# Patient Record
Sex: FEMALE | Race: WHITE | Employment: OTHER | URBAN - METROPOLITAN AREA
[De-identification: names, ages, dates, MRNs, and addresses within clinical notes are randomized per-mention and may not be internally consistent; named-entity substitution may affect disease eponyms.]

---

## 2017-04-05 PROBLEM — J43.9 PULMONARY EMPHYSEMA, UNSPECIFIED EMPHYSEMA TYPE (HCC): Status: ACTIVE | Noted: 2017-04-05

## 2017-04-05 PROBLEM — E11.59 TYPE 2 DIABETES MELLITUS WITH OTHER CIRCULATORY COMPLICATION: Status: ACTIVE | Noted: 2017-04-05

## 2017-04-05 PROBLEM — I10 ESSENTIAL HYPERTENSION: Status: ACTIVE | Noted: 2017-04-05

## 2017-04-05 PROBLEM — J47.9 BRONCHIECTASIS WITHOUT COMPLICATION (HCC): Status: ACTIVE | Noted: 2017-04-05

## 2017-04-05 PROCEDURE — 82570 ASSAY OF URINE CREATININE: CPT | Performed by: INTERNAL MEDICINE

## 2017-04-05 PROCEDURE — 82043 UR ALBUMIN QUANTITATIVE: CPT | Performed by: INTERNAL MEDICINE

## 2017-04-05 PROCEDURE — 87086 URINE CULTURE/COLONY COUNT: CPT | Performed by: INTERNAL MEDICINE

## 2017-04-05 PROCEDURE — 81001 URINALYSIS AUTO W/SCOPE: CPT | Performed by: INTERNAL MEDICINE

## 2017-08-12 PROCEDURE — 87272 CRYPTOSPORIDIUM AG IF: CPT | Performed by: INTERNAL MEDICINE

## 2017-08-12 PROCEDURE — 87209 SMEAR COMPLEX STAIN: CPT | Performed by: INTERNAL MEDICINE

## 2017-08-12 PROCEDURE — 87329 GIARDIA AG IA: CPT | Performed by: INTERNAL MEDICINE

## 2017-08-12 PROCEDURE — 87045 FECES CULTURE AEROBIC BACT: CPT | Performed by: INTERNAL MEDICINE

## 2017-08-12 PROCEDURE — 87493 C DIFF AMPLIFIED PROBE: CPT | Performed by: INTERNAL MEDICINE

## 2017-08-12 PROCEDURE — 87177 OVA AND PARASITES SMEARS: CPT | Performed by: INTERNAL MEDICINE

## 2017-08-12 PROCEDURE — 87427 SHIGA-LIKE TOXIN AG IA: CPT | Performed by: INTERNAL MEDICINE

## 2017-08-12 PROCEDURE — 87046 STOOL CULTR AEROBIC BACT EA: CPT | Performed by: INTERNAL MEDICINE

## 2017-11-16 PROBLEM — E11.8 TYPE 2 DIABETES MELLITUS WITH COMPLICATION, WITHOUT LONG-TERM CURRENT USE OF INSULIN (HCC): Status: ACTIVE | Noted: 2017-04-05

## 2018-04-13 PROBLEM — E11.51 TYPE 2 DIABETES MELLITUS WITH DIABETIC PERIPHERAL ANGIOPATHY WITHOUT GANGRENE, WITHOUT LONG-TERM CURRENT USE OF INSULIN (HCC): Status: ACTIVE | Noted: 2017-04-05

## 2018-05-01 PROCEDURE — 87186 SC STD MICRODIL/AGAR DIL: CPT | Performed by: INTERNAL MEDICINE

## 2018-05-01 PROCEDURE — 87086 URINE CULTURE/COLONY COUNT: CPT | Performed by: INTERNAL MEDICINE

## 2018-05-01 PROCEDURE — 82043 UR ALBUMIN QUANTITATIVE: CPT | Performed by: INTERNAL MEDICINE

## 2018-05-01 PROCEDURE — 87077 CULTURE AEROBIC IDENTIFY: CPT | Performed by: INTERNAL MEDICINE

## 2018-05-01 PROCEDURE — 81001 URINALYSIS AUTO W/SCOPE: CPT | Performed by: INTERNAL MEDICINE

## 2018-05-01 PROCEDURE — 82570 ASSAY OF URINE CREATININE: CPT | Performed by: INTERNAL MEDICINE

## 2018-06-07 PROCEDURE — 88305 TISSUE EXAM BY PATHOLOGIST: CPT | Performed by: INTERNAL MEDICINE

## 2018-10-09 ENCOUNTER — HOSPITAL ENCOUNTER (OUTPATIENT)
Dept: INTERVENTIONAL RADIOLOGY/VASCULAR | Facility: HOSPITAL | Age: 73
Discharge: HOME | End: 2018-10-09
Attending: INTERNAL MEDICINE | Admitting: INTERNAL MEDICINE
Payer: MEDICARE

## 2018-10-09 VITALS
WEIGHT: 180 LBS | HEIGHT: 65 IN | OXYGEN SATURATION: 95 % | BODY MASS INDEX: 29.99 KG/M2 | TEMPERATURE: 99 F | SYSTOLIC BLOOD PRESSURE: 124 MMHG | DIASTOLIC BLOOD PRESSURE: 60 MMHG | RESPIRATION RATE: 19 BRPM | HEART RATE: 86 BPM

## 2018-10-09 DIAGNOSIS — R94.39 ABNORMAL STRESS TEST: ICD-10-CM

## 2018-10-09 PROCEDURE — 86850 RBC ANTIBODY SCREEN: CPT | Performed by: THORACIC SURGERY (CARDIOTHORACIC VASCULAR SURGERY)

## 2018-10-09 PROCEDURE — 85610 PROTHROMBIN TIME: CPT | Performed by: THORACIC SURGERY (CARDIOTHORACIC VASCULAR SURGERY)

## 2018-10-09 PROCEDURE — 85025 COMPLETE CBC W/AUTO DIFF WBC: CPT | Performed by: THORACIC SURGERY (CARDIOTHORACIC VASCULAR SURGERY)

## 2018-10-09 PROCEDURE — 82962 GLUCOSE BLOOD TEST: CPT

## 2018-10-09 PROCEDURE — 83036 HEMOGLOBIN GLYCOSYLATED A1C: CPT | Performed by: THORACIC SURGERY (CARDIOTHORACIC VASCULAR SURGERY)

## 2018-10-09 PROCEDURE — 87086 URINE CULTURE/COLONY COUNT: CPT | Performed by: THORACIC SURGERY (CARDIOTHORACIC VASCULAR SURGERY)

## 2018-10-09 PROCEDURE — 80048 BASIC METABOLIC PNL TOTAL CA: CPT | Performed by: INTERNAL MEDICINE

## 2018-10-09 PROCEDURE — 93458 L HRT ARTERY/VENTRICLE ANGIO: CPT

## 2018-10-09 PROCEDURE — 86900 BLOOD TYPING SEROLOGIC ABO: CPT | Performed by: THORACIC SURGERY (CARDIOTHORACIC VASCULAR SURGERY)

## 2018-10-09 PROCEDURE — 80053 COMPREHEN METABOLIC PANEL: CPT | Performed by: INTERNAL MEDICINE

## 2018-10-09 PROCEDURE — B2151ZZ FLUOROSCOPY OF LEFT HEART USING LOW OSMOLAR CONTRAST: ICD-10-PCS | Performed by: INTERNAL MEDICINE

## 2018-10-09 PROCEDURE — 99152 MOD SED SAME PHYS/QHP 5/>YRS: CPT

## 2018-10-09 PROCEDURE — B2111ZZ FLUOROSCOPY OF MULTIPLE CORONARY ARTERIES USING LOW OSMOLAR CONTRAST: ICD-10-PCS | Performed by: INTERNAL MEDICINE

## 2018-10-09 PROCEDURE — 86901 BLOOD TYPING SEROLOGIC RH(D): CPT | Performed by: THORACIC SURGERY (CARDIOTHORACIC VASCULAR SURGERY)

## 2018-10-09 PROCEDURE — 81001 URINALYSIS AUTO W/SCOPE: CPT | Performed by: THORACIC SURGERY (CARDIOTHORACIC VASCULAR SURGERY)

## 2018-10-09 PROCEDURE — 85730 THROMBOPLASTIN TIME PARTIAL: CPT | Performed by: THORACIC SURGERY (CARDIOTHORACIC VASCULAR SURGERY)

## 2018-10-09 PROCEDURE — 4A023N7 MEASUREMENT OF CARDIAC SAMPLING AND PRESSURE, LEFT HEART, PERCUTANEOUS APPROACH: ICD-10-PCS | Performed by: INTERNAL MEDICINE

## 2018-10-09 RX ORDER — SODIUM CHLORIDE 9 MG/ML
INJECTION, SOLUTION INTRAVENOUS CONTINUOUS
Status: DISCONTINUED | OUTPATIENT
Start: 2018-10-09 | End: 2018-10-09

## 2018-10-09 RX ORDER — HEPARIN SODIUM 5000 [USP'U]/ML
INJECTION, SOLUTION INTRAVENOUS; SUBCUTANEOUS
Status: COMPLETED
Start: 2018-10-09 | End: 2018-10-09

## 2018-10-09 RX ORDER — MIDAZOLAM HYDROCHLORIDE 1 MG/ML
INJECTION INTRAMUSCULAR; INTRAVENOUS
Status: COMPLETED
Start: 2018-10-09 | End: 2018-10-09

## 2018-10-09 RX ORDER — SODIUM CHLORIDE 9 MG/ML
INJECTION, SOLUTION INTRAVENOUS CONTINUOUS
Status: CANCELLED | OUTPATIENT
Start: 2018-10-09 | End: 2018-10-09

## 2018-10-09 RX ORDER — LIDOCAINE HYDROCHLORIDE 10 MG/ML
INJECTION, SOLUTION EPIDURAL; INFILTRATION; INTRACAUDAL; PERINEURAL
Status: COMPLETED
Start: 2018-10-09 | End: 2018-10-09

## 2018-10-09 RX ORDER — VERAPAMIL HYDROCHLORIDE 2.5 MG/ML
INJECTION, SOLUTION INTRAVENOUS
Status: COMPLETED
Start: 2018-10-09 | End: 2018-10-09

## 2018-10-09 RX ORDER — ASPIRIN 81 MG/1
324 TABLET, CHEWABLE ORAL ONCE
Status: DISCONTINUED | OUTPATIENT
Start: 2018-10-09 | End: 2018-10-09 | Stop reason: HOSPADM

## 2018-10-09 RX ADMIN — SODIUM CHLORIDE: 9 INJECTION, SOLUTION INTRAVENOUS at 08:10:00

## 2018-10-09 NOTE — PROGRESS NOTES
Pt s/p C with Dr. Cathleen Aguilera. Access site via R radial artery, Prelude band (15cc) removed with no complications. Dressing applied, c/d/I. VS stable; pt in stable condition, able to ambulate in room without difficulty. IV dc'd.  AVS given and explained to pt, p

## 2018-10-09 NOTE — PROCEDURES
[facility]    PATIENT'S NAME: Caty Chowdhuryato   ATTENDING PHYSICIAN: Gabino Wren. Jordan Arriaga MD   OPERATING PHYSICIAN: Gabino Arriaga MD   PATIENT ACCOUNT#:   [id number]    LOCATION:  [address]  MEDICAL RECORD #:   [id number]       DATE OF large PDA and a moderate posterolateral system. 5.   LVG:  Left ventricular size and ejection fraction are normal.    CONCLUSIONS:  This is a 68-year-old woman with CAD. Her stress test correlates appropriately with the right coronary artery.   We will co

## 2018-10-09 NOTE — PROGRESS NOTES
Cath:    LM: No disease. LAD: 50% eccentric proximal lesion, then 75% mid vessel lesion (difficult to resolve given vessel overlap but clearly diseased in AP cranial view). CX: Moderate to large. 2 OMs. Mild disease. RCA: Dominant. Large.   Ostial 70%

## 2018-10-09 NOTE — PROGRESS NOTES
Pt s/p LHC with Dr. Connolly Jobs today. Dr. Sukhwinder Horan came and saw pt at bedside. Pt's PAT's for surgery with Dr. Sukhwinder Horan completed. Pt given binder for CABG and instructions given for surgery including how to use the surgical scrub and incentive spirometer.  Pt to arrive fo

## 2018-10-09 NOTE — H&P
Cards    See EMR    Abnormal stress. Stable. Current Facility-Administered Medications:  0.9%  NaCl infusion  Intravenous Continuous   aspirin chewable tab 324 mg 324 mg Oral Once      10/09/18  0800   BP: 132/73   Pulse: 76   Resp: 18   Temp: 98. 9

## 2018-10-11 ENCOUNTER — TELEPHONE (OUTPATIENT)
Dept: CARDIOLOGY UNIT | Facility: HOSPITAL | Age: 73
End: 2018-10-11

## 2018-10-11 ENCOUNTER — LAB ENCOUNTER (OUTPATIENT)
Dept: LAB | Facility: HOSPITAL | Age: 73
End: 2018-10-11
Attending: THORACIC SURGERY (CARDIOTHORACIC VASCULAR SURGERY)
Payer: MEDICARE

## 2018-10-11 DIAGNOSIS — R35.0 URINARY FREQUENCY: ICD-10-CM

## 2018-10-11 PROCEDURE — 87088 URINE BACTERIA CULTURE: CPT

## 2018-10-11 PROCEDURE — 87186 SC STD MICRODIL/AGAR DIL: CPT

## 2018-10-11 PROCEDURE — 87086 URINE CULTURE/COLONY COUNT: CPT

## 2018-10-11 PROCEDURE — 81001 URINALYSIS AUTO W/SCOPE: CPT

## 2018-10-11 NOTE — PROGRESS NOTES
Pt called, stated she was told by Dr. Rosalie James office she has a UTI, pt c/o urinary frequency, burning and chills on urination, since yesterday. Reviewed recent UA and complaints w/Dr. Janis Cohn. UA done 10/9 shows no UTI, only normal mark, symptoms began 10/10.

## 2018-10-15 ENCOUNTER — TELEPHONE (OUTPATIENT)
Dept: CARDIOLOGY UNIT | Facility: HOSPITAL | Age: 73
End: 2018-10-15

## 2018-10-15 NOTE — PROGRESS NOTES
UA and Cx reviewed with jaquan Conte/w patient, will prescirbe abx. Pt understands and agrees.   Maki Blanchard RN  Clinical Coordinator  CV Surgery

## 2018-10-15 NOTE — PROGRESS NOTES
[facility]  [phone] Home. 1st attempt    Urology referral ordered. Labs in 6 mo ordered.

## 2018-10-17 ENCOUNTER — ANESTHESIA EVENT (OUTPATIENT)
Dept: CARDIAC SURGERY | Facility: HOSPITAL | Age: 73
DRG: 236 | End: 2018-10-17
Payer: MEDICARE

## 2018-10-17 NOTE — ANESTHESIA PREPROCEDURE EVALUATION
PRE-OP EVALUATION    Patient Name: Dana Sharp    Pre-op Diagnosis: coronary artery disease    Procedure(s):  coronary artery bypass graft   (Dr Ashlie Hsieh to start case)    Surgeon(s) and Role:     * Madhavi Knox MD - Primary    Pre-op vitals reviewed. differently: TAKE ONE TABLET BY MOUTH ONE TIME NIGHTLY) Disp: 90 tablet Rfl: 2   LISINOPRIL 40 MG Oral Tab TAKE ONE TABLET BY MOUTH EVERY DAY Disp: 90 tablet Rfl: 1   MULTI-VITAMIN/MINERALS OR TABS 1 TABLET DAILY Disp:  Rfl:        Allergies: Amlodipine; F BUNIONECTOMY  AND HAMMER TOE REMOVAL   • TOTAL KNEE REPLACEMENT Left      Social History    Tobacco Use      Smoking status: Former Smoker        Packs/day: 1.50        Years: 45.00        Pack years: 67.5        Types: Cigarettes        Quit date: 5/12/20 additional lines including arterial catheter, additional PIVs, central venous catheter/PAC and intraop PANDA. Patient understands extubation will occur once the overall hemodynamic status is stable.  Discussed rare risk of dental trauma from intubation, scra

## 2018-10-18 ENCOUNTER — HOSPITAL ENCOUNTER (INPATIENT)
Facility: HOSPITAL | Age: 73
LOS: 5 days | Discharge: HOME HEALTH | DRG: 236 | End: 2018-10-23
Attending: THORACIC SURGERY (CARDIOTHORACIC VASCULAR SURGERY) | Admitting: THORACIC SURGERY (CARDIOTHORACIC VASCULAR SURGERY)
Payer: MEDICARE

## 2018-10-18 ENCOUNTER — ANESTHESIA (OUTPATIENT)
Dept: CARDIAC SURGERY | Facility: HOSPITAL | Age: 73
DRG: 236 | End: 2018-10-18
Payer: MEDICARE

## 2018-10-18 ENCOUNTER — APPOINTMENT (OUTPATIENT)
Dept: GENERAL RADIOLOGY | Facility: HOSPITAL | Age: 73
DRG: 236 | End: 2018-10-18
Attending: THORACIC SURGERY (CARDIOTHORACIC VASCULAR SURGERY)
Payer: MEDICARE

## 2018-10-18 PROBLEM — I25.10 CAD (CORONARY ARTERY DISEASE): Status: ACTIVE | Noted: 2018-10-18

## 2018-10-18 PROCEDURE — 82803 BLOOD GASES ANY COMBINATION: CPT

## 2018-10-18 PROCEDURE — P9045 ALBUMIN (HUMAN), 5%, 250 ML: HCPCS | Performed by: THORACIC SURGERY (CARDIOTHORACIC VASCULAR SURGERY)

## 2018-10-18 PROCEDURE — 80061 LIPID PANEL: CPT | Performed by: THORACIC SURGERY (CARDIOTHORACIC VASCULAR SURGERY)

## 2018-10-18 PROCEDURE — 5A1221Z PERFORMANCE OF CARDIAC OUTPUT, CONTINUOUS: ICD-10-PCS | Performed by: THORACIC SURGERY (CARDIOTHORACIC VASCULAR SURGERY)

## 2018-10-18 PROCEDURE — 84295 ASSAY OF SERUM SODIUM: CPT | Performed by: ANESTHESIOLOGY

## 2018-10-18 PROCEDURE — 93010 ELECTROCARDIOGRAM REPORT: CPT | Performed by: INTERNAL MEDICINE

## 2018-10-18 PROCEDURE — 82803 BLOOD GASES ANY COMBINATION: CPT | Performed by: ANESTHESIOLOGY

## 2018-10-18 PROCEDURE — 85027 COMPLETE CBC AUTOMATED: CPT | Performed by: THORACIC SURGERY (CARDIOTHORACIC VASCULAR SURGERY)

## 2018-10-18 PROCEDURE — 82330 ASSAY OF CALCIUM: CPT | Performed by: ANESTHESIOLOGY

## 2018-10-18 PROCEDURE — 80048 BASIC METABOLIC PNL TOTAL CA: CPT | Performed by: THORACIC SURGERY (CARDIOTHORACIC VASCULAR SURGERY)

## 2018-10-18 PROCEDURE — 83050 HGB METHEMOGLOBIN QUAN: CPT | Performed by: ANESTHESIOLOGY

## 2018-10-18 PROCEDURE — 85730 THROMBOPLASTIN TIME PARTIAL: CPT | Performed by: THORACIC SURGERY (CARDIOTHORACIC VASCULAR SURGERY)

## 2018-10-18 PROCEDURE — B24BZZ4 ULTRASONOGRAPHY OF HEART WITH AORTA, TRANSESOPHAGEAL: ICD-10-PCS | Performed by: THORACIC SURGERY (CARDIOTHORACIC VASCULAR SURGERY)

## 2018-10-18 PROCEDURE — 85610 PROTHROMBIN TIME: CPT | Performed by: THORACIC SURGERY (CARDIOTHORACIC VASCULAR SURGERY)

## 2018-10-18 PROCEDURE — 83735 ASSAY OF MAGNESIUM: CPT | Performed by: THORACIC SURGERY (CARDIOTHORACIC VASCULAR SURGERY)

## 2018-10-18 PROCEDURE — 82375 ASSAY CARBOXYHB QUANT: CPT | Performed by: ANESTHESIOLOGY

## 2018-10-18 PROCEDURE — 86920 COMPATIBILITY TEST SPIN: CPT

## 2018-10-18 PROCEDURE — 85384 FIBRINOGEN ACTIVITY: CPT | Performed by: THORACIC SURGERY (CARDIOTHORACIC VASCULAR SURGERY)

## 2018-10-18 PROCEDURE — 30233N0 TRANSFUSION OF AUTOLOGOUS RED BLOOD CELLS INTO PERIPHERAL VEIN, PERCUTANEOUS APPROACH: ICD-10-PCS | Performed by: THORACIC SURGERY (CARDIOTHORACIC VASCULAR SURGERY)

## 2018-10-18 PROCEDURE — 84295 ASSAY OF SERUM SODIUM: CPT

## 2018-10-18 PROCEDURE — 85049 AUTOMATED PLATELET COUNT: CPT | Performed by: THORACIC SURGERY (CARDIOTHORACIC VASCULAR SURGERY)

## 2018-10-18 PROCEDURE — 85014 HEMATOCRIT: CPT

## 2018-10-18 PROCEDURE — 02100Z9 BYPASS CORONARY ARTERY, ONE ARTERY FROM LEFT INTERNAL MAMMARY, OPEN APPROACH: ICD-10-PCS | Performed by: THORACIC SURGERY (CARDIOTHORACIC VASCULAR SURGERY)

## 2018-10-18 PROCEDURE — S0028 INJECTION, FAMOTIDINE, 20 MG: HCPCS | Performed by: THORACIC SURGERY (CARDIOTHORACIC VASCULAR SURGERY)

## 2018-10-18 PROCEDURE — 87081 CULTURE SCREEN ONLY: CPT | Performed by: THORACIC SURGERY (CARDIOTHORACIC VASCULAR SURGERY)

## 2018-10-18 PROCEDURE — 85018 HEMOGLOBIN: CPT | Performed by: ANESTHESIOLOGY

## 2018-10-18 PROCEDURE — 84132 ASSAY OF SERUM POTASSIUM: CPT | Performed by: ANESTHESIOLOGY

## 2018-10-18 PROCEDURE — 94150 VITAL CAPACITY TEST: CPT

## 2018-10-18 PROCEDURE — 82330 ASSAY OF CALCIUM: CPT

## 2018-10-18 PROCEDURE — 83605 ASSAY OF LACTIC ACID: CPT | Performed by: ANESTHESIOLOGY

## 2018-10-18 PROCEDURE — 06JY4ZZ INSPECTION OF LOWER VEIN, PERCUTANEOUS ENDOSCOPIC APPROACH: ICD-10-PCS | Performed by: THORACIC SURGERY (CARDIOTHORACIC VASCULAR SURGERY)

## 2018-10-18 PROCEDURE — 84132 ASSAY OF SERUM POTASSIUM: CPT

## 2018-10-18 PROCEDURE — 82962 GLUCOSE BLOOD TEST: CPT

## 2018-10-18 PROCEDURE — 93005 ELECTROCARDIOGRAM TRACING: CPT

## 2018-10-18 PROCEDURE — 021009W BYPASS CORONARY ARTERY, ONE ARTERY FROM AORTA WITH AUTOLOGOUS VENOUS TISSUE, OPEN APPROACH: ICD-10-PCS | Performed by: THORACIC SURGERY (CARDIOTHORACIC VASCULAR SURGERY)

## 2018-10-18 PROCEDURE — 94002 VENT MGMT INPAT INIT DAY: CPT

## 2018-10-18 PROCEDURE — 06BP4ZZ EXCISION OF RIGHT SAPHENOUS VEIN, PERCUTANEOUS ENDOSCOPIC APPROACH: ICD-10-PCS | Performed by: THORACIC SURGERY (CARDIOTHORACIC VASCULAR SURGERY)

## 2018-10-18 PROCEDURE — 85347 COAGULATION TIME ACTIVATED: CPT

## 2018-10-18 PROCEDURE — 71045 X-RAY EXAM CHEST 1 VIEW: CPT | Performed by: THORACIC SURGERY (CARDIOTHORACIC VASCULAR SURGERY)

## 2018-10-18 RX ORDER — FAMOTIDINE 20 MG/1
20 TABLET ORAL 2 TIMES DAILY
Status: DISCONTINUED | OUTPATIENT
Start: 2018-10-18 | End: 2018-10-23

## 2018-10-18 RX ORDER — CHLORHEXIDINE GLUCONATE 0.12 MG/ML
15 RINSE ORAL
Status: DISCONTINUED | OUTPATIENT
Start: 2018-10-18 | End: 2018-10-18

## 2018-10-18 RX ORDER — MORPHINE SULFATE 4 MG/ML
8 INJECTION, SOLUTION INTRAMUSCULAR; INTRAVENOUS
Status: DISCONTINUED | OUTPATIENT
Start: 2018-10-18 | End: 2018-10-23

## 2018-10-18 RX ORDER — MIDAZOLAM HYDROCHLORIDE 1 MG/ML
1 INJECTION INTRAMUSCULAR; INTRAVENOUS EVERY 30 MIN PRN
Status: DISCONTINUED | OUTPATIENT
Start: 2018-10-18 | End: 2018-10-18

## 2018-10-18 RX ORDER — TRAMADOL HYDROCHLORIDE 50 MG/1
50 TABLET ORAL EVERY 6 HOURS PRN
Status: DISCONTINUED | OUTPATIENT
Start: 2018-10-18 | End: 2018-10-23

## 2018-10-18 RX ORDER — DEXTROSE AND SODIUM CHLORIDE 5; .45 G/100ML; G/100ML
INJECTION, SOLUTION INTRAVENOUS CONTINUOUS
Status: ACTIVE | OUTPATIENT
Start: 2018-10-18 | End: 2018-10-19

## 2018-10-18 RX ORDER — POTASSIUM CHLORIDE 14.9 MG/ML
20 INJECTION INTRAVENOUS AS NEEDED
Status: DISCONTINUED | OUTPATIENT
Start: 2018-10-18 | End: 2018-10-21

## 2018-10-18 RX ORDER — ONDANSETRON 2 MG/ML
4 INJECTION INTRAMUSCULAR; INTRAVENOUS EVERY 6 HOURS PRN
Status: DISCONTINUED | OUTPATIENT
Start: 2018-10-18 | End: 2018-10-23

## 2018-10-18 RX ORDER — NITROGLYCERIN 20 MG/100ML
INJECTION INTRAVENOUS CONTINUOUS PRN
Status: DISCONTINUED | OUTPATIENT
Start: 2018-10-18 | End: 2018-10-20

## 2018-10-18 RX ORDER — POTASSIUM CHLORIDE 29.8 MG/ML
40 INJECTION INTRAVENOUS AS NEEDED
Status: DISCONTINUED | OUTPATIENT
Start: 2018-10-18 | End: 2018-10-21

## 2018-10-18 RX ORDER — DEXTROSE MONOHYDRATE 25 G/50ML
50 INJECTION, SOLUTION INTRAVENOUS
Status: DISCONTINUED | OUTPATIENT
Start: 2018-10-18 | End: 2018-10-23

## 2018-10-18 RX ORDER — ASPIRIN 325 MG
325 TABLET ORAL ONCE
Status: COMPLETED | OUTPATIENT
Start: 2018-10-18 | End: 2018-10-18

## 2018-10-18 RX ORDER — ATORVASTATIN CALCIUM 20 MG/1
20 TABLET, FILM COATED ORAL NIGHTLY
Status: DISCONTINUED | OUTPATIENT
Start: 2018-10-18 | End: 2018-10-23

## 2018-10-18 RX ORDER — MAGNESIUM SULFATE 1 G/100ML
1 INJECTION INTRAVENOUS AS NEEDED
Status: DISCONTINUED | OUTPATIENT
Start: 2018-10-18 | End: 2018-10-21

## 2018-10-18 RX ORDER — FAMOTIDINE 10 MG/ML
20 INJECTION, SOLUTION INTRAVENOUS 2 TIMES DAILY
Status: DISCONTINUED | OUTPATIENT
Start: 2018-10-18 | End: 2018-10-21

## 2018-10-18 RX ORDER — TEMAZEPAM 15 MG/1
15 CAPSULE ORAL NIGHTLY PRN
Status: DISCONTINUED | OUTPATIENT
Start: 2018-10-18 | End: 2018-10-23

## 2018-10-18 RX ORDER — BISACODYL 10 MG
10 SUPPOSITORY, RECTAL RECTAL
Status: DISCONTINUED | OUTPATIENT
Start: 2018-10-18 | End: 2018-10-23

## 2018-10-18 RX ORDER — MORPHINE SULFATE 4 MG/ML
4 INJECTION, SOLUTION INTRAMUSCULAR; INTRAVENOUS
Status: DISCONTINUED | OUTPATIENT
Start: 2018-10-18 | End: 2018-10-23

## 2018-10-18 RX ORDER — DEXMEDETOMIDINE HYDROCHLORIDE 4 UG/ML
INJECTION, SOLUTION INTRAVENOUS CONTINUOUS
Status: DISCONTINUED | OUTPATIENT
Start: 2018-10-18 | End: 2018-10-18

## 2018-10-18 RX ORDER — DOCUSATE SODIUM 100 MG/1
100 CAPSULE, LIQUID FILLED ORAL 2 TIMES DAILY
Status: DISCONTINUED | OUTPATIENT
Start: 2018-10-18 | End: 2018-10-23

## 2018-10-18 RX ORDER — MAGNESIUM SULFATE HEPTAHYDRATE 40 MG/ML
2 INJECTION, SOLUTION INTRAVENOUS AS NEEDED
Status: DISCONTINUED | OUTPATIENT
Start: 2018-10-18 | End: 2018-10-21

## 2018-10-18 RX ORDER — ASPIRIN 81 MG/1
81 TABLET ORAL DAILY
Status: DISCONTINUED | OUTPATIENT
Start: 2018-10-19 | End: 2018-10-23

## 2018-10-18 RX ORDER — ALBUMIN, HUMAN INJ 5% 5 %
250 SOLUTION INTRAVENOUS ONCE AS NEEDED
Status: COMPLETED | OUTPATIENT
Start: 2018-10-18 | End: 2018-10-19

## 2018-10-18 RX ORDER — ASPIRIN 300 MG
300 SUPPOSITORY, RECTAL RECTAL ONCE
Status: COMPLETED | OUTPATIENT
Start: 2018-10-18 | End: 2018-10-18

## 2018-10-18 RX ORDER — DOBUTAMINE HYDROCHLORIDE 200 MG/100ML
INJECTION INTRAVENOUS CONTINUOUS PRN
Status: DISCONTINUED | OUTPATIENT
Start: 2018-10-18 | End: 2018-10-20

## 2018-10-18 RX ORDER — SODIUM CHLORIDE 9 MG/ML
INJECTION, SOLUTION INTRAVENOUS CONTINUOUS
Status: DISCONTINUED | OUTPATIENT
Start: 2018-10-18 | End: 2018-10-20

## 2018-10-18 RX ORDER — MORPHINE SULFATE 4 MG/ML
2 INJECTION, SOLUTION INTRAMUSCULAR; INTRAVENOUS
Status: DISCONTINUED | OUTPATIENT
Start: 2018-10-18 | End: 2018-10-23

## 2018-10-18 RX ORDER — POLYETHYLENE GLYCOL 3350 17 G/17G
1 POWDER, FOR SOLUTION ORAL DAILY PRN
Status: DISCONTINUED | OUTPATIENT
Start: 2018-10-18 | End: 2018-10-23

## 2018-10-18 RX ORDER — DEXTROSE AND SODIUM CHLORIDE 5; .45 G/100ML; G/100ML
INJECTION, SOLUTION INTRAVENOUS CONTINUOUS
Status: ACTIVE | OUTPATIENT
Start: 2018-10-18 | End: 2018-10-18

## 2018-10-18 RX ORDER — ALBUMIN, HUMAN INJ 5% 5 %
500 SOLUTION INTRAVENOUS ONCE
Status: COMPLETED | OUTPATIENT
Start: 2018-10-18 | End: 2018-10-18

## 2018-10-18 NOTE — CONSULTS
[facility] [address] [place] [address] Cardiology  Report of Consultation    Td Garner Patient Status:  Inpatient    1945 MRN [id number]   [facility] 6NE-A Attending William Lawrence MD   Hosp Day # 0 PCP Kelly Oliva MD     Reason for [facility] with Dr. Jordon Davis had polyp removal   • Screening for cardiovascular condition 12-3-07    EF 70%, normal CGXT   • Screening for osteoporosis 8-10, 5/15    normal, repeat in 2020   • Sebaceous cyst 4/4/16    upper neck, present for years   • Shingles June 2 Glucose-Vitamin C **OR** dextrose **OR** glucose **OR** Glucose-Vitamin C, Midazolam HCl    Outpatient Medications:     No current facility-administered medications on file prior to encounter.    Current Outpatient Medications on File Prior to Encounter:  a to ascultation bilaterally. No focal rales, rhonchi, or wheezes. Good air movement is noted throughout all lung fields. Abdomen: Soft. Non-distended. Non-tender. Bowel sounds are present and normoactive. No guarding or rebound.    Extremities: Extrem ventricular size and ejection fraction are normal.     CONCLUSIONS:  This is a 69-year-old woman with CAD. Her stress test correlates appropriately with the right coronary artery. We will consider revascularization.   The patient will continue with medica

## 2018-10-18 NOTE — OPERATIVE REPORT
[facility]    PATIENT'S NAME: Silvana Sharpe   ATTENDING PHYSICIAN: Garfield Long MD   OPERATING PHYSICIAN: Garfield Lnog MD   PATIENT ACCOUNT#:   [id number]    LOCATION:  [address]  MEDICAL RECORD #:   [id number]       YOB: 1945 was a 1.5 mm artery of good quality. Cardioplegia was given, following which the LAD was identified. Left internal mammary artery was placed to the LAD, roughly at the junction of the middle and distal third.   The middle of the LAD was buried in an intra

## 2018-10-18 NOTE — ANESTHESIA POSTPROCEDURE EVALUATION
18 [facility] Patient Status:  Inpatient   Age/Gender 75year old female MRN [id number]   [facility] 6NE-A Attending Anthony Brower MD   Hosp Day # 0 PCP Cathi Medeiros MD       Anesthesia Post-op Note    Procedure(s):  co

## 2018-10-18 NOTE — PROGRESS NOTES
Anesthesia Ventilator Management    Patient is s/p CABG x 2  POD#0. Patient is currently sedated and intubated. Vent settings: PRVC   ABG, CXR pending    Will wean FiO2 as tolerated.   Plan for extubation after CPAP trial.

## 2018-10-18 NOTE — CONSULTS
[facility] Hospitalist H&P       CC: medicine consult for comanagement of medical conditions     PCP: Sophie Ivey MD    History of Present Illness: Patient is a 75year old female with PMH sig for CAD, HTN, HL, T2DM, GERD and stable pulm nodules is now POD # back   • Type II or unspecified type diabetes mellitus without mention of complication, not stated as uncontrolled         PSH  Past Surgical History:   Procedure Laterality Date   • APPENDECTOMY  1956   • APPENDECTOMY     • CATARACT Bilateral    • COLONOS METFORMIN  MG Oral Tab TAKE TWO TABLETS BY MOUTH DAILY WITH BREAKFAST Disp: 180 tablet Rfl: 0   SIMVASTATIN 40 MG Oral Tab TAKE ONE TABLET BY MOUTH ONE TIME DAILY  (Patient taking differently: TAKE ONE TABLET BY MOUTH ONE TIME NIGHTLY) Disp: 90 ta Nares normal. Septum midline. Mucosa normal. No drainage.    Throat: Lips, mucosa, and tongue normal. Teeth and gums normal.   Neck: Supple, symmetrical, trachea midline, no cervical or supraclavicular lymph adenopathy, thyroid: no enlargment/tenderness/nod visualized. Mediastinal drain/chest tube noted. Cardiac silhouette is normal in size. Mild left basilar atelectasis. No measurable pneumothorax. Endotracheal tube terminates at the inferior aspect of the thoracic inlet.       CONCLUSION:  No measurable

## 2018-10-18 NOTE — PROGRESS NOTES
Received patient from [place] [address] around 1230. Usual lines. Dobs at 4mcg per Dr. Alisha Mariscal.  titrating levo, Insulin, and precedex. Chest tube has had minimal output. Banda has had adequate output. Patient slowly waking, and following simple commands.  Able to shake hea

## 2018-10-18 NOTE — DIETARY NOTE
Nutrition Short Note    Dietitian consult received per cardiac rehab/CHF protocol. Pt to be educated by cardiac rehab staff and encouraged to attend outpatient classes taught by RD. RD available PRN.     Wayne Comer MS, RD, LDN

## 2018-10-19 ENCOUNTER — APPOINTMENT (OUTPATIENT)
Dept: GENERAL RADIOLOGY | Facility: HOSPITAL | Age: 73
DRG: 236 | End: 2018-10-19
Attending: THORACIC SURGERY (CARDIOTHORACIC VASCULAR SURGERY)
Payer: MEDICARE

## 2018-10-19 PROCEDURE — 80048 BASIC METABOLIC PNL TOTAL CA: CPT | Performed by: THORACIC SURGERY (CARDIOTHORACIC VASCULAR SURGERY)

## 2018-10-19 PROCEDURE — 71045 X-RAY EXAM CHEST 1 VIEW: CPT | Performed by: THORACIC SURGERY (CARDIOTHORACIC VASCULAR SURGERY)

## 2018-10-19 PROCEDURE — 85025 COMPLETE CBC W/AUTO DIFF WBC: CPT | Performed by: THORACIC SURGERY (CARDIOTHORACIC VASCULAR SURGERY)

## 2018-10-19 PROCEDURE — 93005 ELECTROCARDIOGRAM TRACING: CPT

## 2018-10-19 PROCEDURE — 93010 ELECTROCARDIOGRAM REPORT: CPT | Performed by: INTERNAL MEDICINE

## 2018-10-19 PROCEDURE — 97162 PT EVAL MOD COMPLEX 30 MIN: CPT

## 2018-10-19 PROCEDURE — 83735 ASSAY OF MAGNESIUM: CPT | Performed by: THORACIC SURGERY (CARDIOTHORACIC VASCULAR SURGERY)

## 2018-10-19 PROCEDURE — 97116 GAIT TRAINING THERAPY: CPT

## 2018-10-19 PROCEDURE — 82962 GLUCOSE BLOOD TEST: CPT

## 2018-10-19 PROCEDURE — 97166 OT EVAL MOD COMPLEX 45 MIN: CPT

## 2018-10-19 PROCEDURE — P9045 ALBUMIN (HUMAN), 5%, 250 ML: HCPCS | Performed by: THORACIC SURGERY (CARDIOTHORACIC VASCULAR SURGERY)

## 2018-10-19 NOTE — PLAN OF CARE
Assumed are or Miriam @approximately 1915 drowsy but easily awakened; alert, oriented, pleasant, and cooperative with care; extubated prior to start of this shift, on Venti mask weaned to 4L NC; NSR on the monitor; insulin, dobutamine and levophed gtts inf

## 2018-10-19 NOTE — PROGRESS NOTES
[facility] Cardiology  Progress Note    Keaton Marino Patient Status:  Inpatient    1945 MRN [id number]   [facility] 6NE-A Attending [place], MD   Hosp Day # 1 PCP Humberto Martin MD     Subjective:   Extubated.   Resti Summary (Last 24 hours) at 10/19/2018 1111  Last data filed at 10/19/2018 1100  Gross per 24 hour   Intake 6273 ml   Output 4360 ml   Net 1913 ml       Wt Readings from Last 3 Encounters:  10/19/18 : 196 lb 10.4 oz (89.2 kg)  10/05/18 : 180 lb (81.6 kg)  1 NEPRELIM   --   9.06*   WBC  16.0*  11.6   PLT  169.0  145.0*       Recent Labs   Lab  10/18/18   1119  10/18/18   1235   PTP  22.8*  18.3*   INR  1.94*  1.46*       No results for input(s): TROP, CK in the last 168 hours.       Tele:     Diagnostics:
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
## 2018-10-19 NOTE — H&P
2018    RE:    Gearld Leak  : 1945    I had the opportunity to see  patient, Mrs. Riki Carvajal, today for consideration for coronary bypass surgery.     As you know, Mrs. Enos Meigs is a middle-aged female patient wh Physical examination shows an alert female in no distress. Pupils are equal and round. Carotid pulses are equal bilaterally. There are no bruits. The lungs are clear without rales, rhonchi, or wheezing. The spine is straight and in the midline.   Alta Purdy

## 2018-10-19 NOTE — CONSULTS
[facility]    PATIENT'S NAME: Cristinorobert Kike   ATTENDING PHYSICIAN: Martha Carter MD   CONSULTING PHYSICIAN: Kayla Brewer M.D.    PATIENT ACCOUNT#:   [de-identified]    LOCATION:  [address] [place] [address]  MEDICAL RECORD #:   [id number]       DATE OF BIRTH: pancreatitis. Another son with hypertriglyceridemia. Paternal aunt with breast cancer. REVIEW OF SYSTEMS:  Ten-point review of systems unremarkable, otherwise as above. PHYSICAL EXAMINATION:    GENERAL:  No apparent distress.   VITAL SIGNS:  [employer]

## 2018-10-19 NOTE — PROGRESS NOTES
[facility] Hospitalist Progress Note     PCP: Marylin Corcoran MD    SUBJECTIVE:  Extubated overnight. Remains on levophed gtt. Dobutamine gtt weaned off. Pt is up in a chair, feeling well.     OBJECTIVE:  Temp:  [96.9 °F (36.1 °C)-99.2 °F (37.3 °C)] 99.2 °F (3 for input(s): TROP in the last 168 hours. Imaging:  CXR - Minimally improved atelectasis/consolidation in the retrocardiac left lower lobe. Mild increase in atelectasis at the right lung base.       Meds:     • Insulin Aspart Pen  1-30 Units Subcutaneou atelectasis  -successfully extubated; cont aggressive IS    ABLA  -expected surgical blood loss; monitor    Mild thrombocytopenia  -observe for now     Prolonged QT - being monitored in CICU per cardiology  -avoid QT prolonging agents     Mild hyponatremia

## 2018-10-19 NOTE — PHYSICAL THERAPY NOTE
PHYSICAL THERAPY EVALUATION - INPATIENT     Room Number: 5064/7468-Q  Evaluation Date: 10/19/2018  Type of Evaluation: Initial  Physician Order: PT Eval and Treat    Presenting Problem: CAD s/p CABGx2  Reason for Therapy: Mobility Dysfunction and Dis left 11/4/2015   • High blood pressure    • High cholesterol    • History of normal resting EKG 8/22/14   • HTN (hypertension)    • Hyperlipidemia    • Hyperplastic colon polyp 06/13/2018    x2, no need to repeat   • Left-sided low back pain with left-side [address]   • RIGHT PHACOEMULSIFICATION OF CATARACT WITH INTRAOCULAR LENS IMPLANT 13158 Left 7/23/2008    Performed by Phillip Linares MD at [address] [place] [address]   • SPECIAL SERVICE OR REPORT Right     BUNIONECTOMY  AND HAMMER TOE REMOVAL 4+/5; B hip flexion 3+/5; unable to test R knee strength 2/2 pain with ext (pt reports \"it's due for a total knee\")    BALANCE  Static Sitting: Fair -  Dynamic Sitting: Fair -  Static Standing: Fair -  Dynamic Standing: Fair -    ACTIVITY TOLERANCE  Puls lightheadedness upon sitting and vitals are stable. Pt demonstrates appropriate static and dynamic sitting balance with UE support during MMT, coordination, and sensation testing. Pt scoots forward to place feet on ground with CGA.  Pt requires VC for hand returning to prior to level of function. DISCHARGE RECOMMENDATIONS  PT Discharge Recommendations: Home with home health PT    PLAN  PT Treatment Plan: Bed mobility; Endurance; Patient education; Family education;Gait training;Neuromuscular re-educate;Stair t

## 2018-10-19 NOTE — PROGRESS NOTES
[facility]  CV Surgery Progress Note    Appiah Masker Patient Status:  Inpatient    1945 MRN [id number]   [facility] 6NE-A Attending William Lawrence MD   Hosp Day # 1 PCP Kelly Oliva MD     Subjective:  Patient seen this AM, e Kenn Liu  10/19/2018  7:54 AM

## 2018-10-20 PROCEDURE — 80048 BASIC METABOLIC PNL TOTAL CA: CPT | Performed by: THORACIC SURGERY (CARDIOTHORACIC VASCULAR SURGERY)

## 2018-10-20 PROCEDURE — 82962 GLUCOSE BLOOD TEST: CPT

## 2018-10-20 PROCEDURE — 87086 URINE CULTURE/COLONY COUNT: CPT | Performed by: INTERNAL MEDICINE

## 2018-10-20 PROCEDURE — 81001 URINALYSIS AUTO W/SCOPE: CPT | Performed by: INTERNAL MEDICINE

## 2018-10-20 NOTE — PROGRESS NOTES
[facility]  Progress Note    Levi Dykes Patient Status:  Inpatient    1945 MRN [id number]   [facility] 6NE-A Attending Servando Hankins MD   Hosp Day # 2 PCP Faye Ayala MD     Assessment/Plan:  Patient Active Problem List: Summary (Last 24 hours) at 10/20/2018 0821  Last data filed at 10/20/2018 0700  Gross per 24 hour   Intake 1639 ml   Output 1000 ml   Net 639 ml       HEENT: Exam is unremarkable. Neck: Supple. Lungs: Clear bilaterally. Cardiac: Regular rate and rhythm.

## 2018-10-20 NOTE — PROGRESS NOTES
[facility] Cardiology Progress Note - Lily Proctor Patient Status:  Inpatient    1945 MRN [id number]   [facility] 6NE-A Attending Gwenette Curling, MD   Hosp Day # 2 PCP Mishel Santiago MD     Subjective:  Patient f heave or extra cardiac sounds. Lungs: Clear without wheezes, rales, rhonchi or dullness. Normal excursions and effort. Abdomen: Soft, non-tender. No organosplenomegally, mass or rebound, BS-present. Extremities: Without clubbing, cyanosis or edema.   Pe mg 8 mg Intravenous Q1H PRN   temazepam (RESTORIL) cap 15 mg 15 mg Oral Nightly PRN   DOBUTamine in D5W (DOBUTREX) 500 mg/250 ml infusion 2.5-40 mcg/kg/min Intravenous Continuous PRN   nitroGLYCERIN infusion 50mg in D5W 250ml 5-300 mcg/min Intravenous Cont ROS:  General Health: otherwise feels well, weight stable  Constitutiona: no recent fevers  Skin: denies any unusual skin lesions or rashes  Eyes: no visual complaints or deficits  HEENT: denies nasal congestion, sinus pain; hearing loss negative  Re

## 2018-10-20 NOTE — PLAN OF CARE
Assumed care of Miriam @ approximately 1915: awake, alert, oriented, pleasant, and cooperative with care; family at bedside.  Complete de-lined prior to start of this shift; 4L NC, NSR on the monitor; up to bathroom with standby assist; Gurinder Vaz reports mild

## 2018-10-20 NOTE — PROGRESS NOTES
[facility]   [facility] Progress Note    Keaton Marino Patient Status:  Inpatient    1945 MRN [id number]   [facility] 8NE-A Attending [place], MD   Hosp Day # 2 PCP Humberto Martin MD     Subjective:  Pt seen and examined.  Up to 100 mg 100 mg Oral BID   magnesium hydroxide (MILK OF MAGNESIA) 400 MG/5ML suspension 10 mL 10 mL Oral BID PRN   PEG 3350 (MIRALAX) powder packet 17 g 1 packet Oral Daily PRN   bisacodyl (DULCOLAX) rectal suppository 10 mg 10 mg Rectal Daily PRN   ondanset retinopathy ([address]     Aortic atherosclerosis ([address]     Pulmonary scarring     PAD (peripheral artery disease) (HCC)     Essential hypertension     Bronchiectasis without complication ([address]     Pulmonary emphysema, unspecified emphysema type ([address]     CAD (

## 2018-10-20 NOTE — PLAN OF CARE
REC'D FROM CCU AOX3, ON ROOM AIR. O2 SAT 86-88%, REPLACED O2 AT 2LPER N/C AND O2 SAT 93%. lUNGS DIMINIAHED BILAT, RIGHT GREATER THAN LEFT. OCC COUGH WITH NO SPUTUM. PAIN UNDER CONTROL. DENIES NEED FOR PAIN MEDS. VOIDED AND UA SENT. PT UP IN CHAIR.  INCISION

## 2018-10-20 NOTE — HOME CARE LIAISON
Met with pt at the bedside. Pt is declining home health services at this time.  Thank you for the referral.

## 2018-10-21 PROCEDURE — 97110 THERAPEUTIC EXERCISES: CPT

## 2018-10-21 PROCEDURE — 82962 GLUCOSE BLOOD TEST: CPT

## 2018-10-21 PROCEDURE — 97116 GAIT TRAINING THERAPY: CPT

## 2018-10-21 NOTE — PROGRESS NOTES
[facility] Hospitalist Progress Note     PCP: Jennifer Arriola MD    SUBJECTIVE:  Doing well. Pain controlled. Denies sob. On RA.         OBJECTIVE:  Temp:  [97.6 °F (36.4 °C)-98.9 °F (37.2 °C)] 97.6 °F (36.4 °C)  Pulse:  [] 78  Resp:  [16-21] 20  BP: (117- the retrocardiac left lower lobe. Mild increase in atelectasis at the right lung base.       Meds:     • Insulin Aspart Pen  1-30 Units Subcutaneous TID CC and HS   • Insulin Aspart Pen  1-30 Units Subcutaneous TID CC and HS   • aspirin  81 mg Oral Daily

## 2018-10-21 NOTE — PROGRESS NOTES
[facility] Cardiology Progress Note - Katy Mckeon Patient Status:  Inpatient    1945 MRN [id number]   [facility] 8NE-A Attending [place], MD   Hosp Day # 3 PCP Humberto Martin MD     Subjective:  Patient f cardiac sounds. Lungs: Clear without wheezes, rales, rhonchi or dullness. Normal excursions and effort. Abdomen: Soft, non-tender. No organosplenomegally, mass or rebound, BS-present. Extremities: Without clubbing, cyanosis or edema.   Peripheral pulses 15 mg Oral Nightly PRN   metoprolol Tartrate (LOPRESSOR) tab 25 mg 25 mg Oral 2x Daily(Beta Blocker)   docusate sodium (COLACE) cap 100 mg 100 mg Oral BID   Or      docusate sodium (COLACE) liquid 100 mg 100 mg Oral BID   magnesium hydroxide (MILK OF MAGNE no sensory or motor complaint  Psyche: no symptoms of depression or anxiety  Hematology: denies bruising or excessive bleeding  Endocrine: no history of diabetes  Allergy: see above drug allergies    Dipesh Mcmanus MD  10/21/2018  8:21 AM

## 2018-10-21 NOTE — PHYSICAL THERAPY NOTE
PHYSICAL THERAPY TREATMENT NOTE - INPATIENT    Room Number: 6590/9815-B     Session: 1   Number of Visits to Meet Established Goals: 3    Presenting Problem: CAD s/p CABGx2    History related to current admission:   Pt is 75year old female admitted on 10 Jan 2011    endometrial bx neg with Dr. Patricia Barton had polyp removal   • Screening for cardiovascular condition 12-3-07    EF 70%, normal CGXT   • Screening for osteoporosis 8-10, 5/15    normal, repeat in 2020   • Sebaceous cyst 4/4/16    upper neck, presen sternum, incisional  Management Techniques: Activity promotion; Body mechanics    BALANCE                                                                                                                     Static Sitting: Fair +  Dynamic Sitting: Fair + UE, LE, and chest ther ex per CV binder.     THERAPEUTIC EXERCISES  Lower Extremity Alternating marching  Ankle pumps  Heel raises  LAQ  Partial squats, ankle circles     Upper Extremity Elbow flex/ext,  - open/close, Scapular Retraction, Shoulder flex/ precautions without VC.     Goal #6     Goal Comments: Goals established on 10/19/2018, in progress 10/21/2018

## 2018-10-21 NOTE — PLAN OF CARE
Problem: CARDIOVASCULAR - ADULT  Goal: Maintains optimal cardiac output and hemodynamic stability  INTERVENTIONS:  - Monitor vital signs, rhythm, and trends  - Monitor for bleeding, hypotension and signs of decreased cardiac output  - Evaluate effectivenes suctioning and perform as needed  - Assess and instruct to report SOB or any respiratory difficulty  - Respiratory Therapy support as indicated  - Manage/alleviate anxiety  - Monitor for signs/symptoms of CO2 retention  Outcome: Progressing      Problem: P development  - Assess and document skin integrity  - Assess and document dressing/incision, wound bed, drain sites and surrounding tissue  - Implement wound care per orders  - Initiate isolation precautions as appropriate  - Initiate Pressure Ulcer prevent cooperative. SR on Tele monitor, sats greater than 92% on 2 L Oxygen per NC, denied any pain or distress. Pt. Ambulated in halls a total of 3 times today, did report shortness of breath during and after her walks.  Mid sternal and left leg incisions intact

## 2018-10-21 NOTE — PROGRESS NOTES
[facility]   [facility] Progress Note    Adrianna Gallo Patient Status:  Inpatient    1945 MRN [id number]   [facility] 8NE-A Attending Gwenette Curling, MD   Hosp Day # 3 PCP Mishel Santiago MD     Subjective:    Sitting up in chair minim cap 100 mg 100 mg Oral BID   Or      docusate sodium (COLACE) liquid 100 mg 100 mg Oral BID   magnesium hydroxide (MILK OF MAGNESIA) 400 MG/5ML suspension 10 mL 10 mL Oral BID PRN   PEG 3350 (MIRALAX) powder packet 17 g 1 packet Oral Daily PRN   bisacodyl Nonproliferative diabetic retinopathy (HCC)     Aortic atherosclerosis (HCC)     Pulmonary scarring     PAD (peripheral artery disease) (HCC)     Essential hypertension     Bronchiectasis without complication (HCC)     Pulmonary emphysema, unspecified emph

## 2018-10-21 NOTE — PROGRESS NOTES
[facility]  Progress Note    Indigo Upton Patient Status:  Inpatient    1945 MRN [id number]   [facility] 8NE-A Attending Camila Pizarro MD   Hosp Day # 3 PCP Abbi Mcneill MD     Assessment/Plan:  Patient Active Problem List: 8703  Last data filed at 10/21/2018 0825  Gross per 24 hour   Intake 660 ml   Output 1900 ml   Net -1240 ml       HEENT: Exam is unremarkable. Neck: Supple. Lungs: Clear bilaterally. Cardiac: Regular rate and rhythm. Abdomen:   Bowel sounds present, nor

## 2018-10-22 ENCOUNTER — APPOINTMENT (OUTPATIENT)
Dept: ULTRASOUND IMAGING | Facility: HOSPITAL | Age: 73
DRG: 236 | End: 2018-10-22
Attending: PHYSICIAN ASSISTANT
Payer: MEDICARE

## 2018-10-22 ENCOUNTER — APPOINTMENT (OUTPATIENT)
Dept: GENERAL RADIOLOGY | Facility: HOSPITAL | Age: 73
DRG: 236 | End: 2018-10-22
Attending: PHYSICIAN ASSISTANT
Payer: MEDICARE

## 2018-10-22 PROCEDURE — 97110 THERAPEUTIC EXERCISES: CPT

## 2018-10-22 PROCEDURE — 97530 THERAPEUTIC ACTIVITIES: CPT

## 2018-10-22 PROCEDURE — 93970 EXTREMITY STUDY: CPT | Performed by: PHYSICIAN ASSISTANT

## 2018-10-22 PROCEDURE — 71045 X-RAY EXAM CHEST 1 VIEW: CPT | Performed by: PHYSICIAN ASSISTANT

## 2018-10-22 PROCEDURE — 82962 GLUCOSE BLOOD TEST: CPT

## 2018-10-22 RX ORDER — FUROSEMIDE 10 MG/ML
40 INJECTION INTRAMUSCULAR; INTRAVENOUS
Status: DISCONTINUED | OUTPATIENT
Start: 2018-10-22 | End: 2018-10-23

## 2018-10-22 NOTE — PROGRESS NOTES
[facility]  CV Surgery Progress Note    Farhat Costa Patient Status:  Inpatient    1945 MRN [id number]   [facility] 8NE-A Attending Rocío Sargent MD   Hosp Day # 4 PCP Umesh Oakley MD     Subjective:  Patient seen this AM. C

## 2018-10-22 NOTE — PROGRESS NOTES
[facility] Hospitalist Progress Note     PCP: Tyra Johnson MD    SUBJECTIVE:    She is reporting some R sided chest pain. Worse w inspiration. On 02.         OBJECTIVE:  Temp:  [97.6 °F (36.4 °C)-98.4 °F (36.9 °C)] 97.7 °F (36.5 °C)  Pulse:  [72-81] 72 hours.      Meds:     • furosemide  40 mg Intravenous BID (Diuretic)   • Insulin Aspart Pen  1-30 Units Subcutaneous TID CC and HS   • Insulin Aspart Pen  1-30 Units Subcutaneous TID CC and HS   • aspirin  81 mg Oral Daily   • atorvastatin  20 mg Oral Nigh

## 2018-10-22 NOTE — PROGRESS NOTES
[facility]  Progress Note    Bobby Roe Patient Status:  Inpatient    1945 MRN [id number]   [facility] 8NE-A Attending Jeffy eBrgman MD   Hosp Day # 4 PCP Suzie Hendrickson MD     Assessment/Plan:  Patient Active Problem List: Summary (Last 24 hours) at 10/22/2018 0734  Last data filed at 10/22/2018 0446  Gross per 24 hour   Intake 480 ml   Output 1200 ml   Net -720 ml       HEENT: Exam is unremarkable. Neck: Supple. Lungs: Clear bilaterally. Cardiac: Regular rate and rhythm.

## 2018-10-22 NOTE — PHYSICAL THERAPY NOTE
PHYSICAL THERAPY TREATMENT NOTE - INPATIENT    Room Number: 7932/8657-O     Session: 2     Number of Visits to Meet Established Goals: 3    Presenting Problem: CAD s/p CABGx2    History related to current admission: Pt is 75year old female admitted on 10 of poliomyelitis 1947    no residual   • Pneumonia, organism unspecified(486) 1980   • Postmenopausal bleeding Jan 2011    endometrial bx neg with Dr. Rach Gray had polyp removal   • Screening for cardiovascular condition 12-3-07    EF 70%, normal CGXT   • and, nursing reports pt compliance with the activity recommendation of same. Patient’s self-stated goal is go home.     OBJECTIVE  Precautions: Sternal    WEIGHT BEARING RESTRICTION  Weight Bearing Restriction: None                PAIN ASSESSMENT   Rat Assistance: Not tested  Comment : Based on FIM definitions      Bed Mobility  Rolling to the Right = NT  Rolling to the Left = NT  Supine to Sit = NT  Sit to supine = NT    Transfers  Sit to stand = supervision  Stand to sit = supervision      Skilled Ther DISCHARGE RECOMMENDATIONS  PT Discharge Recommendations: Home with home health PT     PLAN  PT Treatment Plan: Bed mobility; Endurance; Patient education; Family education;Gait training;Neuromuscular re-educate;Stair training;Transfer training;Balance t

## 2018-10-22 NOTE — PROGRESS NOTES
[facility] Cardiology  Progress Note    Levi Dykes Patient Status:  Inpatient    1945 MRN [id number]   [facility] 6NE-A Attending Servando Hankins MD   Hosp Day # 4 PCP Faye Ayala MD     Subjective:   Resting comfortab kg)      Allergies:    Amlodipine              SHORTNESS OF BREATH    Comment:Sweating and hair loss as well  Furadantin [Nitrofu*    NAUSEA ONLY    Comment:ITCHING  Pcn [Penicillins Cr*    RASH, NAUSEA ONLY    Comment:ITCHING  Zyban [Bupropion Hc*    RASH hours.      Tele: SR.  Approx 8 beat PSVT    Diagnostics:    Cath:     ANGIOGRAPHIC FINDINGS:    1.     Left main.  The left main has no significant disease.   2.     LAD.  The LAD is a large vessel with an eccentric 40% to 50% proximal stenosis.  Then, at

## 2018-10-22 NOTE — OCCUPATIONAL THERAPY NOTE
Attempted to see pt this PM for OT treatment session. Pt was off floor at ultrasound doppler to r/o DVT.   Will attempted again as pt is medically appropriate pending results of ultrasound. -Mansi [place], MOT, OTR/L

## 2018-10-22 NOTE — PROGRESS NOTES
Pt. On mod. high back rest , breathing pattern easy and non labored. Check O2 sat 85-88 % for >5 mins . Placed O2 at 2 li , cont. Monitor. Not in distress.

## 2018-10-23 VITALS
RESPIRATION RATE: 18 BRPM | BODY MASS INDEX: 30.67 KG/M2 | OXYGEN SATURATION: 90 % | DIASTOLIC BLOOD PRESSURE: 47 MMHG | WEIGHT: 184.06 LBS | SYSTOLIC BLOOD PRESSURE: 101 MMHG | TEMPERATURE: 98 F | HEIGHT: 65 IN | HEART RATE: 81 BPM

## 2018-10-23 PROCEDURE — 97530 THERAPEUTIC ACTIVITIES: CPT

## 2018-10-23 PROCEDURE — 82962 GLUCOSE BLOOD TEST: CPT

## 2018-10-23 PROCEDURE — 80048 BASIC METABOLIC PNL TOTAL CA: CPT | Performed by: INTERNAL MEDICINE

## 2018-10-23 PROCEDURE — 83735 ASSAY OF MAGNESIUM: CPT | Performed by: INTERNAL MEDICINE

## 2018-10-23 PROCEDURE — 97535 SELF CARE MNGMENT TRAINING: CPT

## 2018-10-23 PROCEDURE — 85025 COMPLETE CBC W/AUTO DIFF WBC: CPT | Performed by: INTERNAL MEDICINE

## 2018-10-23 RX ORDER — POTASSIUM CHLORIDE 750 MG/1
10 TABLET, EXTENDED RELEASE ORAL DAILY
Status: DISCONTINUED | OUTPATIENT
Start: 2018-10-24 | End: 2018-10-23

## 2018-10-23 RX ORDER — MAGNESIUM OXIDE 400 MG (241.3 MG MAGNESIUM) TABLET
400 TABLET ONCE
Status: COMPLETED | OUTPATIENT
Start: 2018-10-23 | End: 2018-10-23

## 2018-10-23 RX ORDER — FUROSEMIDE 40 MG/1
40 TABLET ORAL DAILY
Qty: 14 TABLET | Refills: 0 | Status: SHIPPED | OUTPATIENT
Start: 2018-10-24 | End: 2019-01-09

## 2018-10-23 RX ORDER — FUROSEMIDE 40 MG/1
40 TABLET ORAL DAILY
Status: DISCONTINUED | OUTPATIENT
Start: 2018-10-24 | End: 2018-10-23

## 2018-10-23 RX ORDER — POTASSIUM CHLORIDE 750 MG/1
10 TABLET, EXTENDED RELEASE ORAL DAILY
Qty: 14 TABLET | Refills: 0 | Status: SHIPPED | OUTPATIENT
Start: 2018-10-24 | End: 2019-01-09

## 2018-10-23 RX ORDER — POTASSIUM CHLORIDE 20 MEQ/1
40 TABLET, EXTENDED RELEASE ORAL EVERY 4 HOURS
Status: COMPLETED | OUTPATIENT
Start: 2018-10-23 | End: 2018-10-23

## 2018-10-23 NOTE — PROGRESS NOTES
UP WALKING IN THE HALLWAY. 02 SAT-90% ROOM AIR. DENIES C/O DISCOMFORT. DC INSTRUCTION GIVEN.  ON BEDSIDE. VERBALIZED UNDERSTANDING. DC TELE. DC HL.

## 2018-10-23 NOTE — PROGRESS NOTES
[facility] Hospitalist Progress Note     PCP: Sophie Ivey MD    SUBJECTIVE:    No acute events. She feels well. Breathing is stable. Edema has improved.         OBJECTIVE:  Temp:  [97.8 °F (36.6 °C)-98.7 °F (37.1 °C)] 97.8 °F (36.6 °C)  Pulse:  [ 10/22/18   1155  10/22/18   1649  10/22/18   2135  10/23/18   0733   PGLU  129*  90  141*  109*  121*       No results for input(s): TROP in the last 168 hours.       Meds:     • Potassium Chloride ER  40 mEq Oral Q4H   • furosemide  40 mg Intravenous BID (

## 2018-10-23 NOTE — PROGRESS NOTES
[facility]  Progress Note    Yesenia Cornell Patient Status:  Inpatient    1945 MRN [id number]   [facility] 8NE-A Attending Danitza Hawkins MD   Hosp Day # 5 PCP Bakari Tom MD     Assessment/Plan:  Patient Active Problem List: %.    Intake/Output Summary (Last 24 hours) at 10/23/2018 0815  Last data filed at 10/23/2018 0532  Gross per 24 hour   Intake 720 ml   Output 2600 ml   Net -1880 ml       HEENT: Exam is unremarkable. Neck: Supple. Lungs: Clear bilaterally.   Cardiac: Reg

## 2018-10-23 NOTE — PROGRESS NOTES
[facility] Cardiology  Progress Note    Marina Atkins Patient Status:  Inpatient    1945 MRN [id number]   [facility] 6NE-A Attending Willie Siddiqui MD   Hosp Day # 5 PCP Trang Bernardo MD     Subjective:   Feels better over Comment:Sweating and hair loss as well  Furadantin [Nitrofu*    NAUSEA ONLY    Comment:ITCHING  Pcn [Penicillins Cr*    RASH, NAUSEA ONLY    Comment:ITCHING  Zyban [Bupropion Hc*    RASH    Physical Exam:   General:  Well-developed / Well-nourished.   No ac hours.      Tele: SR.  Approx 8 beat PSVT    Diagnostics:    Cath:     ANGIOGRAPHIC FINDINGS:    1.     Left main.  The left main has no significant disease.   2.     LAD.  The LAD is a large vessel with an eccentric 40% to 50% proximal stenosis.  Then, at

## 2018-10-23 NOTE — PLAN OF CARE
Pt is AOx4, denies chest pain or dyspnea. 2L nasal cannula. Sternal incision C/D/I with steri strips. Donor sites to bilateral legs are C/D/I. QID accucheck. SR on tele. Up with SBA. Will monitor.     0530:  Tried to wean patient to room air and O2 l

## 2018-10-23 NOTE — HOME CARE LIAISON
Referral received from  Joycelyn Richardson in rounds. Able to accept patient's insurance. Brochure and my card provided, thanks.

## 2018-10-23 NOTE — PROGRESS NOTES
[facility]  CV Surgery Progress Note    Mitesh Jacobsen Patient Status:  Inpatient    1945 MRN [id number]   [facility] 8NE-A Attending Mayra Louise MD   Hosp Day # 5 PCP Sophie Ivey MD     Subjective:  Patient seen this AM ea LAURO/Conrad  10/23/2018  8:37 AM

## 2018-10-23 NOTE — OCCUPATIONAL THERAPY NOTE
OCCUPATIONAL THERAPY TREATMENT NOTE - INPATIENT     Room Number: 1101/4078-J  Session: 1  Number of Visits to Meet Established Goals: 5    Presenting Problem: CABG    History related to current admission: Pt is s/p CABG.      Problem List  Active Problems: • APPENDECTOMY  1956   • APPENDECTOMY     • CATARACT Bilateral    • COLONOSCOPY  6-08    Dr. Liv Pulido, normal, repeat in 2018   • [facility] N/A 10/18/2018    Performed by Danitza Hawkins MD at [address]  2011    [facility] (including washing, rinsing, drying)?: A Little  -   Toileting, which includes using toilet, bedpan or urinal? : A Little  -   Putting on and taking off regular upper body clothing?: None  -   Taking care of personal grooming such as brushing teeth?: None techniques;ADL training;IADL training;Functional transfer training; Endurance training;Patient/Family education;Patient/Family training;Equipment eval/education; Compensatory technique education;Continued evaluation  Rehab Potential : Good  Frequency (Obs):

## 2018-10-23 NOTE — PROGRESS NOTES
Met with patient and  to discuss discharge instructions, activity restrictions and recommendations, follow up visits, all questions answered, encouraged to call with any questions or concerns.    Varinder Porter RN  Clinical Coordinator  CV Surgery

## 2018-10-26 NOTE — DISCHARGE SUMMARY
[facility]  Discharge Summary    Darien Fletcher Patient Status:  Inpatient    1945 MRN [id number]   [facility] 8NE-A Attending No att. providers found   Hosp Day # 5 PCP Saad Skinner MD     Admit date: 10/18/2018    Discharge

## 2018-10-26 NOTE — H&P
2018       RE:    Namorion Markie  : 1945        I had the opportunity to see patient, Mrs. Dayan Sanchez, today for consideration for coronary bypass surgery.     As you know, Mrs. Johnathan Jacinto is a middle-aged female [de-identified] Physical examination shows an alert female in no distress. Pupils are equal and round. Carotid pulses are equal bilaterally. There are no bruits. The lungs are clear without rales, rhonchi, or wheezing. The spine is straight and in the midline.   Alta Purdy

## 2018-10-30 ENCOUNTER — HOSPITAL ENCOUNTER (OUTPATIENT)
Dept: GENERAL RADIOLOGY | Facility: HOSPITAL | Age: 73
Discharge: HOME | End: 2018-10-30
Attending: THORACIC SURGERY (CARDIOTHORACIC VASCULAR SURGERY)
Payer: MEDICARE

## 2018-10-30 DIAGNOSIS — Z98.890 HISTORY OF OPEN HEART SURGERY: ICD-10-CM

## 2018-10-30 PROBLEM — Z95.1 HX OF CABG: Status: ACTIVE | Noted: 2018-10-30

## 2018-10-30 PROCEDURE — 71048 X-RAY EXAM CHEST 4+ VIEWS: CPT | Performed by: THORACIC SURGERY (CARDIOTHORACIC VASCULAR SURGERY)

## 2018-11-11 NOTE — PROGRESS NOTES
[facility] Hospitalist Progress Note     PCP: Sharyle Field, MD    SUBJECTIVE:  Doing well    OBJECTIVE:       Intake/Output:  No intake or output data in the 24 hours ending 11/11/18 0858    Last 3 Weights  11/01/18 1554 : 181 lb (82.1 kg)  10/30/18 1415 : 179 resolution - if there is no resolution, she will need eval by urology    Hypoxia - due to atelectasis-resolved  -successfully extubated; cont aggressive IS    ABLA  -expected surgical blood loss; monitor    Mild thrombocytopenia  -observe for now     Prolo

## 2018-11-27 ENCOUNTER — APPOINTMENT (OUTPATIENT)
Dept: CARDIAC REHAB | Facility: HOSPITAL | Age: 73
End: 2018-11-27
Attending: INTERNAL MEDICINE

## 2018-12-06 PROCEDURE — 81015 MICROSCOPIC EXAM OF URINE: CPT | Performed by: UROLOGY

## 2018-12-27 ENCOUNTER — APPOINTMENT (OUTPATIENT)
Dept: CARDIAC REHAB | Facility: HOSPITAL | Age: 73
End: 2018-12-27
Attending: INTERNAL MEDICINE
Payer: MEDICARE

## 2019-01-09 ENCOUNTER — CARDPULM VISIT (OUTPATIENT)
Dept: CARDIAC REHAB | Facility: HOSPITAL | Age: 74
End: 2019-01-09
Attending: INTERNAL MEDICINE
Payer: MEDICARE

## 2019-01-09 PROCEDURE — 93798 PHYS/QHP OP CAR RHAB W/ECG: CPT

## 2019-01-14 ENCOUNTER — CARDPULM VISIT (OUTPATIENT)
Dept: CARDIAC REHAB | Facility: HOSPITAL | Age: 74
End: 2019-01-14
Attending: INTERNAL MEDICINE
Payer: MEDICARE

## 2019-01-14 PROCEDURE — 93798 PHYS/QHP OP CAR RHAB W/ECG: CPT

## 2019-01-16 ENCOUNTER — CARDPULM VISIT (OUTPATIENT)
Dept: CARDIAC REHAB | Facility: HOSPITAL | Age: 74
End: 2019-01-16
Attending: INTERNAL MEDICINE
Payer: MEDICARE

## 2019-01-16 PROCEDURE — 93798 PHYS/QHP OP CAR RHAB W/ECG: CPT

## 2019-01-18 ENCOUNTER — CARDPULM VISIT (OUTPATIENT)
Dept: CARDIAC REHAB | Facility: HOSPITAL | Age: 74
End: 2019-01-18
Attending: INTERNAL MEDICINE
Payer: MEDICARE

## 2019-01-18 PROCEDURE — 93798 PHYS/QHP OP CAR RHAB W/ECG: CPT

## 2019-01-21 ENCOUNTER — CARDPULM VISIT (OUTPATIENT)
Dept: CARDIAC REHAB | Facility: HOSPITAL | Age: 74
End: 2019-01-21
Attending: INTERNAL MEDICINE
Payer: MEDICARE

## 2019-01-21 PROCEDURE — 93798 PHYS/QHP OP CAR RHAB W/ECG: CPT

## 2019-01-23 ENCOUNTER — CARDPULM VISIT (OUTPATIENT)
Dept: CARDIAC REHAB | Facility: HOSPITAL | Age: 74
End: 2019-01-23
Attending: INTERNAL MEDICINE
Payer: MEDICARE

## 2019-01-23 PROCEDURE — 93798 PHYS/QHP OP CAR RHAB W/ECG: CPT

## 2019-01-25 ENCOUNTER — CARDPULM VISIT (OUTPATIENT)
Dept: CARDIAC REHAB | Facility: HOSPITAL | Age: 74
End: 2019-01-25
Attending: INTERNAL MEDICINE
Payer: MEDICARE

## 2019-01-25 PROCEDURE — 93798 PHYS/QHP OP CAR RHAB W/ECG: CPT

## 2019-02-01 ENCOUNTER — CARDPULM VISIT (OUTPATIENT)
Dept: CARDIAC REHAB | Facility: HOSPITAL | Age: 74
End: 2019-02-01
Attending: INTERNAL MEDICINE
Payer: MEDICARE

## 2019-02-01 PROCEDURE — 93798 PHYS/QHP OP CAR RHAB W/ECG: CPT

## 2019-02-04 ENCOUNTER — CARDPULM VISIT (OUTPATIENT)
Dept: CARDIAC REHAB | Facility: HOSPITAL | Age: 74
End: 2019-02-04
Attending: INTERNAL MEDICINE
Payer: MEDICARE

## 2019-02-04 PROCEDURE — 93798 PHYS/QHP OP CAR RHAB W/ECG: CPT

## 2019-02-06 ENCOUNTER — CARDPULM VISIT (OUTPATIENT)
Dept: CARDIAC REHAB | Facility: HOSPITAL | Age: 74
End: 2019-02-06
Attending: INTERNAL MEDICINE
Payer: MEDICARE

## 2019-02-06 PROCEDURE — 93798 PHYS/QHP OP CAR RHAB W/ECG: CPT

## 2019-02-11 ENCOUNTER — CARDPULM VISIT (OUTPATIENT)
Dept: CARDIAC REHAB | Facility: HOSPITAL | Age: 74
End: 2019-02-11
Attending: INTERNAL MEDICINE
Payer: MEDICARE

## 2019-02-11 PROCEDURE — 93798 PHYS/QHP OP CAR RHAB W/ECG: CPT

## 2019-02-13 ENCOUNTER — CARDPULM VISIT (OUTPATIENT)
Dept: CARDIAC REHAB | Facility: HOSPITAL | Age: 74
End: 2019-02-13
Attending: INTERNAL MEDICINE
Payer: MEDICARE

## 2019-02-13 PROCEDURE — 93798 PHYS/QHP OP CAR RHAB W/ECG: CPT

## 2019-02-15 ENCOUNTER — CARDPULM VISIT (OUTPATIENT)
Dept: CARDIAC REHAB | Facility: HOSPITAL | Age: 74
End: 2019-02-15
Attending: INTERNAL MEDICINE
Payer: MEDICARE

## 2019-02-15 PROCEDURE — 93798 PHYS/QHP OP CAR RHAB W/ECG: CPT

## 2019-02-18 ENCOUNTER — APPOINTMENT (OUTPATIENT)
Dept: CARDIAC REHAB | Facility: HOSPITAL | Age: 74
End: 2019-02-18
Payer: MEDICARE

## 2019-02-25 ENCOUNTER — APPOINTMENT (OUTPATIENT)
Dept: CARDIAC REHAB | Facility: HOSPITAL | Age: 74
End: 2019-02-25
Payer: MEDICARE

## 2019-02-27 ENCOUNTER — APPOINTMENT (OUTPATIENT)
Dept: CARDIAC REHAB | Facility: HOSPITAL | Age: 74
End: 2019-02-27
Payer: MEDICARE

## 2019-03-01 ENCOUNTER — APPOINTMENT (OUTPATIENT)
Dept: CARDIAC REHAB | Facility: HOSPITAL | Age: 74
End: 2019-03-01
Payer: MEDICARE

## 2019-03-04 ENCOUNTER — APPOINTMENT (OUTPATIENT)
Dept: CARDIAC REHAB | Facility: HOSPITAL | Age: 74
End: 2019-03-04
Payer: MEDICARE

## 2019-03-06 ENCOUNTER — APPOINTMENT (OUTPATIENT)
Dept: CARDIAC REHAB | Facility: HOSPITAL | Age: 74
End: 2019-03-06
Payer: MEDICARE

## 2019-03-08 ENCOUNTER — APPOINTMENT (OUTPATIENT)
Dept: CARDIAC REHAB | Facility: HOSPITAL | Age: 74
End: 2019-03-08
Payer: MEDICARE

## 2019-03-11 ENCOUNTER — APPOINTMENT (OUTPATIENT)
Dept: CARDIAC REHAB | Facility: HOSPITAL | Age: 74
End: 2019-03-11
Payer: MEDICARE

## 2019-03-13 ENCOUNTER — APPOINTMENT (OUTPATIENT)
Dept: CARDIAC REHAB | Facility: HOSPITAL | Age: 74
End: 2019-03-13
Payer: MEDICARE

## 2019-03-15 ENCOUNTER — APPOINTMENT (OUTPATIENT)
Dept: CARDIAC REHAB | Facility: HOSPITAL | Age: 74
End: 2019-03-15
Payer: MEDICARE

## 2019-03-18 ENCOUNTER — APPOINTMENT (OUTPATIENT)
Dept: CARDIAC REHAB | Facility: HOSPITAL | Age: 74
End: 2019-03-18
Payer: MEDICARE

## 2019-03-20 ENCOUNTER — APPOINTMENT (OUTPATIENT)
Dept: CARDIAC REHAB | Facility: HOSPITAL | Age: 74
End: 2019-03-20
Payer: MEDICARE

## 2019-03-22 ENCOUNTER — APPOINTMENT (OUTPATIENT)
Dept: CARDIAC REHAB | Facility: HOSPITAL | Age: 74
End: 2019-03-22
Payer: MEDICARE

## 2019-03-25 ENCOUNTER — APPOINTMENT (OUTPATIENT)
Dept: CARDIAC REHAB | Facility: HOSPITAL | Age: 74
End: 2019-03-25
Payer: MEDICARE

## 2019-03-27 ENCOUNTER — APPOINTMENT (OUTPATIENT)
Dept: CARDIAC REHAB | Facility: HOSPITAL | Age: 74
End: 2019-03-27
Payer: MEDICARE

## 2019-03-29 ENCOUNTER — APPOINTMENT (OUTPATIENT)
Dept: CARDIAC REHAB | Facility: HOSPITAL | Age: 74
End: 2019-03-29
Payer: MEDICARE

## 2019-04-01 ENCOUNTER — APPOINTMENT (OUTPATIENT)
Dept: CARDIAC REHAB | Facility: HOSPITAL | Age: 74
End: 2019-04-01
Payer: MEDICARE

## 2019-04-02 PROBLEM — I70.0 AORTIC CALCIFICATION (HCC): Status: ACTIVE | Noted: 2019-04-02

## 2019-04-02 PROBLEM — I70.0 AORTIC CALCIFICATION: Status: ACTIVE | Noted: 2019-04-02

## 2019-04-03 ENCOUNTER — APPOINTMENT (OUTPATIENT)
Dept: CARDIAC REHAB | Facility: HOSPITAL | Age: 74
End: 2019-04-03
Payer: MEDICARE

## 2019-04-05 ENCOUNTER — APPOINTMENT (OUTPATIENT)
Dept: CARDIAC REHAB | Facility: HOSPITAL | Age: 74
End: 2019-04-05
Payer: MEDICARE

## 2019-04-08 ENCOUNTER — APPOINTMENT (OUTPATIENT)
Dept: CARDIAC REHAB | Facility: HOSPITAL | Age: 74
End: 2019-04-08
Payer: MEDICARE

## 2019-04-12 PROCEDURE — 81001 URINALYSIS AUTO W/SCOPE: CPT | Performed by: INTERNAL MEDICINE

## 2019-04-12 PROCEDURE — 87086 URINE CULTURE/COLONY COUNT: CPT | Performed by: INTERNAL MEDICINE

## 2019-12-16 PROBLEM — E11.51 TYPE 2 DIABETES MELLITUS WITH DIABETIC PERIPHERAL ANGIOPATHY WITHOUT GANGRENE, WITHOUT LONG-TERM CURRENT USE OF INSULIN (HCC): Status: ACTIVE | Noted: 2019-12-16

## 2020-07-18 PROBLEM — I20.8 EXERTIONAL ANGINA (HCC): Status: RESOLVED | Noted: 2020-07-18 | Resolved: 2020-07-18

## 2020-07-18 PROBLEM — I20.8 EXERTIONAL ANGINA (HCC): Status: ACTIVE | Noted: 2020-07-18

## 2020-07-18 PROBLEM — I20.89 EXERTIONAL ANGINA: Status: RESOLVED | Noted: 2020-07-18 | Resolved: 2020-07-18

## 2020-07-18 PROBLEM — I20.89 EXERTIONAL ANGINA: Status: ACTIVE | Noted: 2020-07-18

## 2020-10-07 PROCEDURE — 88305 TISSUE EXAM BY PATHOLOGIST: CPT | Performed by: INTERNAL MEDICINE

## 2021-06-30 PROBLEM — R80.9 TYPE 2 DIABETES MELLITUS WITH MICROALBUMINURIA, WITHOUT LONG-TERM CURRENT USE OF INSULIN (HCC): Status: ACTIVE | Noted: 2021-06-30

## 2021-06-30 PROBLEM — E11.29 TYPE 2 DIABETES MELLITUS WITH MICROALBUMINURIA, WITHOUT LONG-TERM CURRENT USE OF INSULIN (HCC): Status: ACTIVE | Noted: 2021-06-30

## 2021-06-30 PROBLEM — E11.29 TYPE 2 DIABETES MELLITUS WITH KIDNEY COMPLICATION, WITHOUT LONG-TERM CURRENT USE OF INSULIN (HCC): Status: ACTIVE | Noted: 2021-06-30

## 2025-06-25 ENCOUNTER — HOSPITAL ENCOUNTER (INPATIENT)
Facility: HOSPITAL | Age: 80
LOS: 4 days | Discharge: NURSING, REHAB OR LONG-TERM CARE | DRG: 481 | End: 2025-07-01
Attending: EMERGENCY MEDICINE | Admitting: HOSPITALIST
Payer: MEDICARE

## 2025-06-25 ENCOUNTER — APPOINTMENT (OUTPATIENT)
Dept: CT IMAGING | Facility: HOSPITAL | Age: 80
DRG: 481 | End: 2025-06-25
Attending: EMERGENCY MEDICINE
Payer: MEDICARE

## 2025-06-25 ENCOUNTER — APPOINTMENT (OUTPATIENT)
Dept: GENERAL RADIOLOGY | Facility: HOSPITAL | Age: 80
DRG: 481 | End: 2025-06-25
Attending: HOSPITALIST
Payer: MEDICARE

## 2025-06-25 ENCOUNTER — APPOINTMENT (OUTPATIENT)
Dept: GENERAL RADIOLOGY | Facility: HOSPITAL | Age: 80
DRG: 481 | End: 2025-06-25
Attending: EMERGENCY MEDICINE
Payer: MEDICARE

## 2025-06-25 DIAGNOSIS — M97.8XXA PERIPROSTHETIC FRACTURE OF FEMUR AT TIP OF PROSTHESIS, INITIAL ENCOUNTER: Primary | ICD-10-CM

## 2025-06-25 DIAGNOSIS — Z96.649 PERIPROSTHETIC FRACTURE OF FEMUR AT TIP OF PROSTHESIS, INITIAL ENCOUNTER: Primary | ICD-10-CM

## 2025-06-25 LAB
ALBUMIN SERPL-MCNC: 4.6 G/DL (ref 3.2–4.8)
ALBUMIN/GLOB SERPL: 1.7 {RATIO} (ref 1–2)
ALP LIVER SERPL-CCNC: 91 U/L (ref 55–142)
ALT SERPL-CCNC: 20 U/L (ref 10–49)
ANION GAP SERPL CALC-SCNC: 10 MMOL/L (ref 0–18)
ANTIBODY SCREEN: NEGATIVE
AST SERPL-CCNC: 23 U/L (ref ?–34)
BASOPHILS # BLD AUTO: 0.05 X10(3) UL (ref 0–0.2)
BASOPHILS NFR BLD AUTO: 0.4 %
BILIRUB SERPL-MCNC: 0.7 MG/DL (ref 0.2–1.1)
BUN BLD-MCNC: 22 MG/DL (ref 9–23)
CALCIUM BLD-MCNC: 9.3 MG/DL (ref 8.7–10.6)
CHLORIDE SERPL-SCNC: 110 MMOL/L (ref 98–112)
CK SERPL-CCNC: 42 U/L (ref 34–145)
CO2 SERPL-SCNC: 23 MMOL/L (ref 21–32)
CREAT BLD-MCNC: 0.81 MG/DL (ref 0.55–1.02)
EGFRCR SERPLBLD CKD-EPI 2021: 73 ML/MIN/1.73M2 (ref 60–?)
EOSINOPHIL # BLD AUTO: 0.11 X10(3) UL (ref 0–0.7)
EOSINOPHIL NFR BLD AUTO: 0.9 %
ERYTHROCYTE [DISTWIDTH] IN BLOOD BY AUTOMATED COUNT: 16.7 %
EST. AVERAGE GLUCOSE BLD GHB EST-MCNC: 128 MG/DL (ref 68–126)
GLOBULIN PLAS-MCNC: 2.7 G/DL (ref 2–3.5)
GLUCOSE BLD-MCNC: 120 MG/DL (ref 70–99)
GLUCOSE BLD-MCNC: 130 MG/DL (ref 70–99)
GLUCOSE BLD-MCNC: 152 MG/DL (ref 70–99)
HBA1C MFR BLD: 6.1 % (ref ?–5.7)
HCT VFR BLD AUTO: 40.2 % (ref 35–48)
HGB BLD-MCNC: 13.3 G/DL (ref 12–16)
IMM GRANULOCYTES # BLD AUTO: 0.18 X10(3) UL (ref 0–1)
IMM GRANULOCYTES NFR BLD: 1.4 %
LYMPHOCYTES # BLD AUTO: 1.44 X10(3) UL (ref 1–4)
LYMPHOCYTES NFR BLD AUTO: 11.5 %
MCH RBC QN AUTO: 32.5 PG (ref 26–34)
MCHC RBC AUTO-ENTMCNC: 33.1 G/DL (ref 31–37)
MCV RBC AUTO: 98.3 FL (ref 80–100)
MONOCYTES # BLD AUTO: 0.72 X10(3) UL (ref 0.1–1)
MONOCYTES NFR BLD AUTO: 5.8 %
NEUTROPHILS # BLD AUTO: 9.97 X10 (3) UL (ref 1.5–7.7)
NEUTROPHILS # BLD AUTO: 9.97 X10(3) UL (ref 1.5–7.7)
NEUTROPHILS NFR BLD AUTO: 80 %
OSMOLALITY SERPL CALC.SUM OF ELEC: 301 MOSM/KG (ref 275–295)
PLATELET # BLD AUTO: 268 10(3)UL (ref 150–450)
POTASSIUM SERPL-SCNC: 3.9 MMOL/L (ref 3.5–5.1)
PROT SERPL-MCNC: 7.3 G/DL (ref 5.7–8.2)
RBC # BLD AUTO: 4.09 X10(6)UL (ref 3.8–5.3)
RH BLOOD TYPE: POSITIVE
SODIUM SERPL-SCNC: 143 MMOL/L (ref 136–145)
WBC # BLD AUTO: 12.5 X10(3) UL (ref 4–11)

## 2025-06-25 PROCEDURE — 94760 N-INVAS EAR/PLS OXIMETRY 1: CPT

## 2025-06-25 PROCEDURE — 80053 COMPREHEN METABOLIC PANEL: CPT | Performed by: EMERGENCY MEDICINE

## 2025-06-25 PROCEDURE — 86900 BLOOD TYPING SEROLOGIC ABO: CPT | Performed by: ORTHOPAEDIC SURGERY

## 2025-06-25 PROCEDURE — 82550 ASSAY OF CK (CPK): CPT | Performed by: EMERGENCY MEDICINE

## 2025-06-25 PROCEDURE — 86901 BLOOD TYPING SEROLOGIC RH(D): CPT | Performed by: ORTHOPAEDIC SURGERY

## 2025-06-25 PROCEDURE — 86850 RBC ANTIBODY SCREEN: CPT | Performed by: ORTHOPAEDIC SURGERY

## 2025-06-25 PROCEDURE — 99285 EMERGENCY DEPT VISIT HI MDM: CPT

## 2025-06-25 PROCEDURE — 73560 X-RAY EXAM OF KNEE 1 OR 2: CPT | Performed by: EMERGENCY MEDICINE

## 2025-06-25 PROCEDURE — 83036 HEMOGLOBIN GLYCOSYLATED A1C: CPT | Performed by: HOSPITALIST

## 2025-06-25 PROCEDURE — 71045 X-RAY EXAM CHEST 1 VIEW: CPT | Performed by: HOSPITALIST

## 2025-06-25 PROCEDURE — 96375 TX/PRO/DX INJ NEW DRUG ADDON: CPT

## 2025-06-25 PROCEDURE — 70450 CT HEAD/BRAIN W/O DYE: CPT | Performed by: EMERGENCY MEDICINE

## 2025-06-25 PROCEDURE — 96374 THER/PROPH/DIAG INJ IV PUSH: CPT

## 2025-06-25 PROCEDURE — 82962 GLUCOSE BLOOD TEST: CPT

## 2025-06-25 PROCEDURE — 85025 COMPLETE CBC W/AUTO DIFF WBC: CPT | Performed by: EMERGENCY MEDICINE

## 2025-06-25 RX ORDER — HEPARIN SODIUM 5000 [USP'U]/ML
5000 INJECTION, SOLUTION INTRAVENOUS; SUBCUTANEOUS EVERY 8 HOURS SCHEDULED
Status: DISCONTINUED | OUTPATIENT
Start: 2025-06-25 | End: 2025-06-26 | Stop reason: ALTCHOICE

## 2025-06-25 RX ORDER — ACETAMINOPHEN 500 MG
500 TABLET ORAL EVERY 4 HOURS PRN
Status: DISCONTINUED | OUTPATIENT
Start: 2025-06-25 | End: 2025-07-01

## 2025-06-25 RX ORDER — HYDROCODONE BITARTRATE AND ACETAMINOPHEN 5; 325 MG/1; MG/1
1 TABLET ORAL EVERY 8 HOURS PRN
COMMUNITY
End: 2025-07-01

## 2025-06-25 RX ORDER — METOCLOPRAMIDE HYDROCHLORIDE 5 MG/ML
5 INJECTION INTRAMUSCULAR; INTRAVENOUS EVERY 8 HOURS PRN
Status: DISCONTINUED | OUTPATIENT
Start: 2025-06-25 | End: 2025-07-01

## 2025-06-25 RX ORDER — HYDROCODONE BITARTRATE AND ACETAMINOPHEN 5; 325 MG/1; MG/1
2 TABLET ORAL EVERY 4 HOURS PRN
Status: DISCONTINUED | OUTPATIENT
Start: 2025-06-25 | End: 2025-07-01

## 2025-06-25 RX ORDER — NICOTINE POLACRILEX 4 MG
30 LOZENGE BUCCAL
Status: DISCONTINUED | OUTPATIENT
Start: 2025-06-25 | End: 2025-07-01

## 2025-06-25 RX ORDER — LISINOPRIL 20 MG/1
20 TABLET ORAL DAILY
COMMUNITY

## 2025-06-25 RX ORDER — MORPHINE SULFATE 4 MG/ML
4 INJECTION, SOLUTION INTRAMUSCULAR; INTRAVENOUS ONCE
Status: COMPLETED | OUTPATIENT
Start: 2025-06-25 | End: 2025-06-25

## 2025-06-25 RX ORDER — MORPHINE SULFATE 4 MG/ML
4 INJECTION, SOLUTION INTRAMUSCULAR; INTRAVENOUS EVERY 2 HOUR PRN
Status: DISCONTINUED | OUTPATIENT
Start: 2025-06-25 | End: 2025-07-01

## 2025-06-25 RX ORDER — MORPHINE SULFATE 2 MG/ML
1 INJECTION, SOLUTION INTRAMUSCULAR; INTRAVENOUS EVERY 2 HOUR PRN
Status: DISCONTINUED | OUTPATIENT
Start: 2025-06-25 | End: 2025-07-01

## 2025-06-25 RX ORDER — ONDANSETRON 2 MG/ML
4 INJECTION INTRAMUSCULAR; INTRAVENOUS ONCE
Status: COMPLETED | OUTPATIENT
Start: 2025-06-25 | End: 2025-06-25

## 2025-06-25 RX ORDER — ASPIRIN 81 MG/1
81 TABLET ORAL DAILY
Status: DISCONTINUED | OUTPATIENT
Start: 2025-06-25 | End: 2025-07-01

## 2025-06-25 RX ORDER — MORPHINE SULFATE 2 MG/ML
2 INJECTION, SOLUTION INTRAMUSCULAR; INTRAVENOUS EVERY 2 HOUR PRN
Status: DISCONTINUED | OUTPATIENT
Start: 2025-06-25 | End: 2025-07-01

## 2025-06-25 RX ORDER — ATORVASTATIN CALCIUM 20 MG/1
20 TABLET, FILM COATED ORAL NIGHTLY
Status: DISCONTINUED | OUTPATIENT
Start: 2025-06-25 | End: 2025-07-01

## 2025-06-25 RX ORDER — HYDROCODONE BITARTRATE AND ACETAMINOPHEN 5; 325 MG/1; MG/1
1 TABLET ORAL EVERY 4 HOURS PRN
Status: DISCONTINUED | OUTPATIENT
Start: 2025-06-25 | End: 2025-07-01

## 2025-06-25 RX ORDER — ACETAMINOPHEN 500 MG
1000 TABLET ORAL ONCE
Status: COMPLETED | OUTPATIENT
Start: 2025-06-25 | End: 2025-06-25

## 2025-06-25 RX ORDER — LISINOPRIL 10 MG/1
10 TABLET ORAL DAILY
Status: DISCONTINUED | OUTPATIENT
Start: 2025-06-25 | End: 2025-07-01

## 2025-06-25 RX ORDER — ONDANSETRON 2 MG/ML
4 INJECTION INTRAMUSCULAR; INTRAVENOUS EVERY 6 HOURS PRN
Status: DISCONTINUED | OUTPATIENT
Start: 2025-06-25 | End: 2025-07-01

## 2025-06-25 RX ORDER — METOPROLOL SUCCINATE 25 MG/1
25 TABLET, EXTENDED RELEASE ORAL DAILY
COMMUNITY

## 2025-06-25 RX ORDER — NICOTINE POLACRILEX 4 MG
15 LOZENGE BUCCAL
Status: DISCONTINUED | OUTPATIENT
Start: 2025-06-25 | End: 2025-07-01

## 2025-06-25 RX ORDER — DEXTROSE MONOHYDRATE 25 G/50ML
50 INJECTION, SOLUTION INTRAVENOUS
Status: DISCONTINUED | OUTPATIENT
Start: 2025-06-25 | End: 2025-07-01

## 2025-06-25 RX ORDER — ACETAMINOPHEN 325 MG/1
650 TABLET ORAL EVERY 4 HOURS PRN
Status: DISCONTINUED | OUTPATIENT
Start: 2025-06-25 | End: 2025-07-01

## 2025-06-25 RX ORDER — ASPIRIN 81 MG/1
81 TABLET ORAL DAILY
COMMUNITY
End: 2025-07-01

## 2025-06-25 RX ORDER — DICLOFENAC SODIUM 75 MG/1
75 TABLET, DELAYED RELEASE ORAL 2 TIMES DAILY
COMMUNITY
End: 2025-07-01

## 2025-06-25 NOTE — PROGRESS NOTES
NURSING ADMISSION NOTE      Patient admitted via Cart  Oriented to room.  Safety precautions initiated.  Bed in low position.  Call light in reach.

## 2025-06-25 NOTE — CM/SW NOTE
Spoke to patient and daughter in law at the bedside who inform me that the patient has been living at home alone and is not able to take care of herself. Some family members live close by but they work. She reports that she typically will use a walker to walk. Pt here after a mechanical fall. Family concerned that the patient needs more care. I informed the patient about the possibility of placement at a NH for long term care and private duty care givers. I also gave liat from a place for mom  a call. At this time patient does have a femur fracture and will be medical admitted. I provided the patient with a respite care packet for them to look over. Plan for PT/OT evals as well.

## 2025-06-25 NOTE — H&P
[facility]   part of [facility]    History and Physical     Miriam Contreras Patient Status:  Emergency    1945 MRN [id number]   Location [facility] EMERGENCY DEPARTMENT Attending Yonis Macias MD   Hosp Day # 0 PCP Juanpablo Bowles MD     Chief Complaint: Pain in the left lower extremity    History of Present Illness: Miriam Contreras is a 79 year old female with history of coronary disease with bypass, type 2 diabetes, emphysema, GERD, hypertension, hyperlipidemia, osteoarthritis, history of polio presenting with left lower extremity pain.  Appears that patient had a mechanical fall.  Afterwards she was unable to move her left leg due to pain.  She was able to call her son who eventually called the paramedic.  Patient was brought to emergency room for further evaluation and treatment.  Patient denied any chest pain, palpitations, dizziness, shortness of breath, focal weaknesses.  Patient denies any other significant positive review of systems.    Past Medical History:Past Medical History[1]     Past Surgical History: Past Surgical History[2]    Social History:  reports that she quit smoking about 18 years ago. Her smoking use included cigarettes. She started smoking about 63 years ago. She has a 67.5 pack-year smoking history. She has never used smokeless tobacco. She reports that she does not currently use alcohol. She reports that she does not use drugs., 4 children, walker, live alone    Family History: Family History[3]  Mother passed  Father passed    Allergies: Allergies[4]    Medications:  Medications Ordered Prior to Encounter[5]    Review of Systems:   A comprehensive 14 point review of systems was completed.    Pertinent positives and negatives noted in the HPI.    Physical Exam:    /74   Pulse 104   Temp 97.9 °F (36.6 °C) (Oral)   Resp 15   Ht 5' 3\" (1.6 m)   Wt 150 lb (68 kg)   SpO2 95%   BMI 26.57 kg/m²   General: No acute distress. Alert and oriented    HEENT: Normocephalic atraumatic. Moist mucous membranes. EOM-I.   Neck: No JVD. No carotid bruits.  Respiratory: Clear to auscultation bilaterally. No wheezes. No crackles  Cardiovascular: S1, S2. Regular rate and rhythm. No murmurs  Chest and Back: No tenderness or deformity.  Abdomen: Soft, nontender, nondistended.  Positive bowel sounds. No rebound, guarding  Neurologic: No focal neurological deficits. CNII-XII grossly intact.  Sensation and strength intact  Musculoskeletal: Moves all extremities.  Extremities: No pitting edema or tenderness of the lower extremities  Integument: No new rashes or lesions.   Psychiatric: Appropriate mood and affect.      Diagnostic Data:      Labs:  Recent Labs   Lab 06/25/25  1323   WBC 12.5*   HGB 13.3   MCV 98.3   .0       Recent Labs   Lab 06/25/25  1323   *   BUN 22   CREATSERUM 0.81   CA 9.3   ALB 4.6      K 3.9      CO2 23.0   ALKPHO 91   AST 23   ALT 20   BILT 0.7   TP 7.3       Estimated Creatinine Clearance: 45.8 mL/min (based on SCr of 0.81 mg/dL).    No results for input(s): \"PTP\", \"INR\" in the last 168 hours.    Recent Labs   Lab 06/25/25  1323   CK 42       Imaging: Imaging data reviewed in Epic.      ASSESSMENT / PLAN:   79 year old female with history of coronary disease with bypass, type 2 diabetes, emphysema, GERD, hypertension, hyperlipidemia, osteoarthritis, history of polio presenting with left lower extremity pain.    Left Femoral fracture  -ortho consulted  -prn pain meds  -npo for surgery tomorrow    Fall  -mechanical  -no further eval needed at this time    CAD hx  -asa  -atorvastatin     HTN  -sbp stable  -lisinopril    HLD  -atorvastatin     Type 2 DM  -hold home meds  -low dose insulin sliding scale    Preop eval  -no active pulm or cardiac complaints  -check cxr  -cardiology consulted  -no further inpt evaluation or treatment needed prior to surgical intervention if ok with cardiology and no acute pathology on  cxr    Quality:  DVT Prophylaxis: scd, heparin sq  CODE status: Full   Banda: none    Plan of care discussed with patient and staff    Dispo: no discharge    MD Lucy Anne Hospitalist  [phone]                           [1]   Past Medical History:   Aortic atherosclerosis    on LDCT    Collagenous colitis    Coronary atherosclerosis    abn cgxt had cabg 10/18/18, Dr. Martines, Dr. Neves 1/24/20, 9/20    Cyst of left kidney    Diabetes mellitus (HCC)    Emphysema lung (HCC)    no symptoms 4/13/18, sees lung doc prn    Encounter for eye exam    Dr. Gordillo    Encounter for screening for lung cancer    CT with stable nodules, recheck in 10/21 ( she quit tob in 2007)    Exertional angina    Resolved last addressed  10/2/18 Per order 10/2/18    GERD (gastroesophageal reflux disease)    due to hiatal hernia, not bothersome 2013, 4/5/17    Herniated nucleus pulposus, L4-5 left    History of cardiovascular stress test    neg stress echo pre-cardiac rehab    History of normal resting EKG    HTN (hypertension)    Hyperlipidemia LDL goal <70    Hyperplastic colon polyp    x2, no need to repeat    Left-sided low back pain with left-sided sciatica    flare up 7/24/20    Living will in place    Lung nodules    Dr. Cervantes stable on CT 2012, new one 4/29/19, recheck 1 year    Obesity    Osteoarthritis    Other nephritis and nephropathy, not specified as acute or chronic, with specified pathological lesion in kidney    in high school    Personal history of poliomyelitis    no residual    Pneumonia, organism unspecified(486)    Polio (HCC)    no residual    Postmenopausal bleeding    endometrial bx neg with Dr. Menchaca had polyp removal    Screening for breast cancer    refuses mammos in future. no longer needed over age 75    Screening for cardiovascular condition    EF 70%, normal CGXT    Screening for osteoporosis    normal, repeat in 2020, refused 2021    Sebaceous cyst    upper neck, present for years    Shingles    L  upper back   [2]   Past Surgical History:  Procedure Laterality Date    Appendectomy  1956    Cabg, artery-vein, two  10/18/2018    Dr. Martines, Dr. Salmon    Colonoscopy  6-08, 6/7/18    Dr. Galvez, normal, repeat in 2018    Colonoscopy  10/7/20= Collagenous colitis    Repeat PRN    Hip surgery Left 02/28/2020    ORIF Dr. Piedra at [place], went home from hosp. using walker, had home PT    Hysteroscopy  2011    uterine polyp removed    Knee replacement surgery Left 9/5/14    Dr. Champion at [place], happy with results    Remv cataract extracap,insert lens  7/23/08    Performed by CT DOLAN at [facility]    Remv cataract extracap,insert lens  8/20/08    Performed by CT DOLAN at [facility] bilateral with IOL's    Revise thumb tendon Left 1970's    Special service or report Right     BUNIONECTOMY  AND HAMMER TOE REMOVAL   [3]   Family History  Problem Relation Age of Onset    Other (Other) Father         UROSEPSIS    Hypertension Mother     Other (Other) Mother         KIDNEY REMOVAL; OD ON PRESCRIPTION RX    Other (Other) Maternal Grandmother         PNEUMONIA    Other (Other) Maternal Grandfather         CVA    Heart Disorder Paternal Grandmother     Heart Disorder Paternal Grandfather     Obesity Paternal Grandfather     Other (Other) Son         GERD    Other (pancreatitis) Son         sphincter or Oddi    Other (knee arthritis) Son     Lipids Son 49        hypertriglyceridemia    Breast Cancer Paternal Aunt 52   [4]   Allergies  Allergen Reactions    Amlodipine SHORTNESS OF BREATH     Sweating and hair loss as well    Furadantin [Nitrofurantoin] NAUSEA ONLY     ITCHING    Pcn [Penicillins [employer]] RASH and NAUSEA ONLY     ITCHING    Zyban [Bupropion Hcl] RASH   [5]   No current facility-administered medications on file prior to encounter.     Current Outpatient Medications on File Prior to Encounter   Medication Sig Dispense Refill    metFORMIN 500 MG Oral Tab TAKE  2 TABLETS BY MOUTH EVERY MORNING WITH BREAKFAST 180 tablet 4    simvastatin 40 MG Oral Tab Take 1 tablet (40 mg total) by mouth daily. 90 tablet 4    lisinopril 10 MG Oral Tab Take 1 tablet (10 mg total) by mouth daily. 90 tablet 3    metoprolol tartrate 25 MG Oral Tab Take 1 tablet (25 mg total) by mouth 2 (two) times daily. (Patient not taking: Reported on 3/14/2022) 180 tablet 3    aspirin 81 MG Oral Tab Take 81 mg by mouth daily. Take 4 am of angiogram 10/9/18       MULTI-VITAMIN/MINERALS OR TABS 1 TABLET DAILY

## 2025-06-25 NOTE — ED QUICK NOTES
Orders for admission, patient is aware of plan and ready to go upstairs. Any questions, please call ED RN Yolis MCCLUOD at extension [phone].     Patient Covid vaccination status: Fully vaccinated     COVID Test Ordered in ED: None    COVID Suspicion at Admission: N/A    Running Infusions: Medication Infusions[1] None    Mental Status/LOC at time of transport: A&Ox4    Other pertinent information:   CIWA score: N/A   NIH score:  N/A             [1]

## 2025-06-25 NOTE — ED INITIAL ASSESSMENT (HPI)
Pt presents to ED via ems from home alone, mechanical fall. Denies hitting head or thinners. Ccollar is on per ems

## 2025-06-25 NOTE — ED PROVIDER NOTES
Patient Seen in: [facility]        History  Chief Complaint   Patient presents with    Fall     Stated Complaint: ccollar on patient. lives home alone, poss failure to thrive. fell at home unwi*    Subjective:   HPI            Patient is an 79-year-old female who presents by ambulance from home for evaluation after a fall.  Patient lives alone in her own home.  She states she was walking with her walker today, she has a grabber to her that she uses to pick things up, she tried to tossed onto the couch and this caused her to lose her balance and fall forward, sort of to the left.  She landed primarily on her left knee and that does feel sore.  She thinks she did hit her head on the ground but just at the very end of the fall.  She states it was not hard, did not lose consciousness and does not have a headache or any other complaints.  She was unable to get up but she has a life alert button so she hit that to call paramedics.  Her only complaint is left knee pain.      Objective:     Past Medical History:    Aortic atherosclerosis    on LDCT    Collagenous colitis    Coronary atherosclerosis    abn cgxt had cabg 10/18/18, Dr. Martines, Dr. Neves 1/24/20, 9/20    Cyst of left kidney    Diabetes mellitus (HCC)    Emphysema lung (HCC)    no symptoms 4/13/18, sees lung doc prn    Encounter for eye exam    Dr. Gordillo    Encounter for screening for lung cancer    CT with stable nodules, recheck in 10/21 ( she quit tob in 2007)    Exertional angina    Resolved last addressed  10/2/18 Per order 10/2/18    GERD (gastroesophageal reflux disease)    due to hiatal hernia, not bothersome 2013, 4/5/17    Herniated nucleus pulposus, L4-5 left    History of cardiovascular stress test    neg stress echo pre-cardiac rehab    History of normal resting EKG    HTN (hypertension)    Hyperlipidemia LDL goal <70    Hyperplastic colon polyp    x2, no need to repeat    Left-sided low back pain with left-sided sciatica     flare up 7/24/20    Living will in place    Lung nodules    Dr. Cervantes stable on CT 2012, new one 4/29/19, recheck 1 year    Obesity    Osteoarthritis    Other nephritis and nephropathy, not specified as acute or chronic, with specified pathological lesion in kidney    in high school    Personal history of poliomyelitis    no residual    Pneumonia, organism unspecified(486)    Polio (HCC)    no residual    Postmenopausal bleeding    endometrial bx neg with Dr. Menchaca had polyp removal    Screening for breast cancer    refuses mammos in future. no longer needed over age 75    Screening for cardiovascular condition    EF 70%, normal CGXT    Screening for osteoporosis    normal, repeat in 2020, refused 2021    Sebaceous cyst    upper neck, present for years    Shingles    L upper back              Past Surgical History:   Procedure Laterality Date    Appendectomy  1956    Cabg, artery-vein, two  10/18/2018    Dr. Martines, Dr. Salmon    Colonoscopy  6-08, 6/7/18    Dr. Galvez, normal, repeat in 2018    Colonoscopy  10/7/20= Collagenous colitis    Repeat PRN    Hip surgery Left 02/28/2020    ORIF Dr. Piedra at [place], went home from hosp. using walker, had home PT    Hysteroscopy  2011    uterine polyp removed    Knee replacement surgery Left 9/5/14    Dr. Champion at [place], happy with results    Remv cataract extracap,insert lens  7/23/08    Performed by CT DOLAN at [facility]    Remv cataract extracap,insert lens  8/20/08    Performed by CT DOLAN at [facility] bilateral with IOL's    Revise thumb tendon Left 1970's    Special service or report Right     BUNIONECTOMY  AND HAMMER TOE REMOVAL                Social History     Socioeconomic History    Marital status:     Number of children: 4   Occupational History    Occupation: retired   Tobacco Use    Smoking status: Former     Current packs/day: 0.00     Average packs/day: 1.5 packs/day for 45.0 years (67.5 ttl  pk-yrs)     Types: Cigarettes     Start date: 1962     Quit date: 2007     Years since quittin.1    Smokeless tobacco: Never    Tobacco comments:     discussed LDCT 19, she'll consider. agreeable 20, 7/15/21   Vaping Use    Vaping status: Never Used   Substance and Sexual Activity    Alcohol use: Not Currently    Drug use: No    Sexual activity: Yes     Partners: Male   Other Topics Concern     Service No    Blood Transfusions No    Exercise Yes     Comment: impaired by back issues, 20    Seat Belt Yes    Self-Exams No     Comment: again 11, 13, 3/27/14, 3/30/15, 16, 17, 18, 19, 20   Social History Narrative     42 YRS                                Physical Exam    ED Triage Vitals   BP 25 1330 (!) 170/74   Pulse 25 1330 109   Resp 25 1330 20   Temp 25 1512 97.9 °F (36.6 °C)   Temp src 25 1512 Oral   SpO2 25 1330 100 %   O2 Device 25 1330 None (Room air)       Current Vitals:   Vital Signs  BP: 152/70  Pulse: 106  Resp: 26  Temp: 97.9 °F (36.6 °C)  Temp src: Oral  MAP (mmHg): 94    Oxygen Therapy  SpO2: 95 %  O2 Device: None (Room air)            Physical Exam  Vitals and nursing note reviewed.   Constitutional:       Appearance: She is well-developed.      Comments: Somewhat cachectic-appearing.   HENT:      Head: Normocephalic and atraumatic.   Eyes:      Conjunctiva/sclera: Conjunctivae normal.      Pupils: Pupils are equal, round, and reactive to light.   Neck:      Comments: There is no midline cervical spine tenderness to palpation, step-off, deformity.  C-spine is cleared by Nexus criteria.  Cardiovascular:      Rate and Rhythm: Normal rate and regular rhythm.      Heart sounds: Normal heart sounds.   Pulmonary:      Effort: Pulmonary effort is normal.      Breath sounds: Normal breath sounds.   Abdominal:      General: Bowel sounds are normal.      Palpations: Abdomen is soft.    Musculoskeletal:         General: Normal range of motion.      Cervical back: Normal range of motion and neck supple.      Comments: There is mild diffuse swelling and tenderness to palpation of the left knee with no obvious deformity or dislocation.  Stable on exam.  Right lower extremity looks a little bit rotated externally but she has good range of motion at the hip and there is no tenderness to palpation.   Skin:     General: Skin is warm and dry.   Neurological:      Mental Status: She is alert and oriented to person, place, and time.                 ED Course  Labs Reviewed   COMP METABOLIC PANEL (14) - Abnormal; Notable for the following components:       Result Value    Glucose 130 (*)     Calculated Osmolality 301 (*)     All other components within normal limits   CBC WITH DIFFERENTIAL WITH PLATELET - Abnormal; Notable for the following components:    WBC 12.5 (*)     Neutrophil Absolute Prelim 9.97 (*)     Neutrophil Absolute 9.97 (*)     All other components within normal limits   CK CREATINE KINASE (NOT CREATININE) - Normal          XR KNEE (1 OR 2 VIEWS), LEFT (CPT=73560)  Result Date: 6/25/2025  PROCEDURE: XR KNEE (1 OR 2 VIEWS), LEFT (CPT=73560) INDICATIONS: Fall, landed on knee, knee pain COMPARISON: Comparison made to a previous portable left knee radiographs from 9/5/2014. FINDINGS: Bones: Obliquely oriented diaphyseal fracture extending to the metaphysis noted. There is anterior displacement of the distal fracture fragment by 12 mm. The fracture deformity extends to the left femoral arthroplasty prosthesis. There is no subluxation or dislocation. There is mild comminution of the fracture. The patient is status post total left knee arthroplasty. Prosthetic components appear normally located. There is a lipohemarthrosis seen within the knee joint. Soft Tissues: Lipohemarthrosis seen in the knee joint. Extensive vascular calcifications are seen.     CONCLUSION: Acute fracture involving the  visualized portion of the mid and distal femoral diaphysis extending to the metaphysis and femoral component of the left knee arthroplasty. There is anterior displacement of the distal fracture fragment and there is mild comminution. There is a lipohemarthrosis seen. Electronically Verified and Signed by Attending Radiologist: Isiah Barraza MD 6/25/2025 3:40 PM Workstation: [id number]    CT BRAIN OR HEAD (CPT=70450)  Result Date: 6/25/2025  PROCEDURE: CT BRAIN OR HEAD (CPT=70450) INDICATIONS: Fall, struck head COMPARISON: There are no comparisons for this exam. TECHNIQUE: Noncontrast CT scanning is performed through the brain. Automated exposure control techniques for dose reduction were used. Dose information is transmitted to the ACR (American College of Radiology) NRDR (National Radiology Data Registry) which includes the Dose Index Registry. FINDINGS: FINDINGS: There is cerebral atrophy with symmetric prominence of the ventricles. There are patchy areas of low attenuation in the periventricular and deep white matter which are nonspecific but most consistent with small vessel ischemic changes. There is no evidence of hemorrhage, mass, midline shift, or extra-axial fluid collection. The visualized paranasal sinuses show no significant sinus disease. No evidence of depressed skull fracture.     CONCLUSION: 1. No acute intracranial findings 2. Cerebral atrophy with chronic microvascular ischemic changes. Electronically Verified and Signed by Attending Radiologist: Kal Fuller MD 6/25/2025 2:50 PM Workstation: [id number]                      MDM     Patient is an 79-year-old female presenting for evaluation after a fall.  She lives alone at home and as noted above reports she is lost her balance while using her walker and fell.  She states she landed on her left knee and that really is her only complaint.  She does concede that she probably hit her head lightly and in light of that we will send her for CT  brain although no headache, loss of consciousness, nausea, mental status changes.  Will check labs to evaluate for dehydration, electrolyte abnormality, rhabdomyolysis.  She states she hit her life alert button call the paramedics so she was not on the ground long but she does appear somewhat disheveled and chronically ill-appearing.  Apparently her son is aware of what happened so have to be in touch with him regarding results as well.    Admission disposition: 6/25/2025  4:03 PM       Update at 3:55 PM.  CT brain is unremarkable, chronic changes but nothing acute.  Unfortunately she does have a periprosthetic distal femur fracture as noted above.  Case will be discussed with orthopedics but with surgical or nonweightbearing she will not be able to go home tonight that she will need to be seen by PT and work through how to get around.  Suspect she will likely need placement at least for acute/subacute rehab as well.      Past Medical History-hypertension, diabetes, high cholesterol    Differential diagnosis before testing included ICH, skull fracture, knee fracture    Co-morbidities that add to the complexity of management include: None    Testing ordered during this visit included labs, CT brain, knee x-ray    Radiographic images  I personally reviewed the radiographs and my individual interpretation shows no ICH or skull fracture  I also reviewed the official reports that showed no ICH or skull fracture      History obtained by an independent source included from paramedics, family    Discussion of management with hospitalist, orthopedics          Disposition:    Admission  I have discussed with the patient the results of test, differential diagnosis, and treatment plan. They expressed clear understanding of these instructions and agrees to the plan provided.         Medical Decision Making      Disposition and Plan     Clinical Impression:  1. Periprosthetic fracture of femur at tip of prosthesis, initial  encounter         Disposition:  Admit  6/25/2025  4:03 pm    Follow-up:  No follow-up provider specified.        Medications Prescribed:  Current Discharge Medication List                Supplementary Documentation:         Hospital Problems       Present on Admission  Date Reviewed: 3/14/2022          ICD-10-CM Noted POA    * (Principal) Periprosthetic fracture of femur at tip of prosthesis, initial encounter M97.8XXA, Z96.649 6/25/2025 Unknown

## 2025-06-26 ENCOUNTER — APPOINTMENT (OUTPATIENT)
Dept: GENERAL RADIOLOGY | Facility: HOSPITAL | Age: 80
DRG: 481 | End: 2025-06-26
Attending: ORTHOPAEDIC SURGERY
Payer: MEDICARE

## 2025-06-26 ENCOUNTER — ANESTHESIA (OUTPATIENT)
Dept: SURGERY | Facility: HOSPITAL | Age: 80
End: 2025-06-26
Payer: MEDICARE

## 2025-06-26 ENCOUNTER — ANESTHESIA EVENT (OUTPATIENT)
Dept: SURGERY | Facility: HOSPITAL | Age: 80
End: 2025-06-26
Payer: MEDICARE

## 2025-06-26 LAB
ANION GAP SERPL CALC-SCNC: 9 MMOL/L (ref 0–18)
ATRIAL RATE: 98 BPM
BASOPHILS # BLD AUTO: 0.04 X10(3) UL (ref 0–0.2)
BASOPHILS NFR BLD AUTO: 0.5 %
BUN BLD-MCNC: 24 MG/DL (ref 9–23)
CALCIUM BLD-MCNC: 8.8 MG/DL (ref 8.7–10.6)
CHLORIDE SERPL-SCNC: 110 MMOL/L (ref 98–112)
CO2 SERPL-SCNC: 25 MMOL/L (ref 21–32)
CREAT BLD-MCNC: 0.82 MG/DL (ref 0.55–1.02)
EGFRCR SERPLBLD CKD-EPI 2021: 72 ML/MIN/1.73M2 (ref 60–?)
EOSINOPHIL # BLD AUTO: 0.16 X10(3) UL (ref 0–0.7)
EOSINOPHIL NFR BLD AUTO: 2 %
ERYTHROCYTE [DISTWIDTH] IN BLOOD BY AUTOMATED COUNT: 16.6 %
GLUCOSE BLD-MCNC: 121 MG/DL (ref 70–99)
GLUCOSE BLD-MCNC: 135 MG/DL (ref 70–99)
GLUCOSE BLD-MCNC: 135 MG/DL (ref 70–99)
GLUCOSE BLD-MCNC: 152 MG/DL (ref 70–99)
GLUCOSE BLD-MCNC: 162 MG/DL (ref 70–99)
HCT VFR BLD AUTO: 31.8 % (ref 35–48)
HGB BLD-MCNC: 10.5 G/DL (ref 12–16)
IMM GRANULOCYTES # BLD AUTO: 0.03 X10(3) UL (ref 0–1)
IMM GRANULOCYTES NFR BLD: 0.4 %
LYMPHOCYTES # BLD AUTO: 1.06 X10(3) UL (ref 1–4)
LYMPHOCYTES NFR BLD AUTO: 13.5 %
MAGNESIUM SERPL-MCNC: 1.8 MG/DL (ref 1.6–2.6)
MCH RBC QN AUTO: 32.5 PG (ref 26–34)
MCHC RBC AUTO-ENTMCNC: 33 G/DL (ref 31–37)
MCV RBC AUTO: 98.5 FL (ref 80–100)
MONOCYTES # BLD AUTO: 0.57 X10(3) UL (ref 0.1–1)
MONOCYTES NFR BLD AUTO: 7.3 %
NEUTROPHILS # BLD AUTO: 5.97 X10 (3) UL (ref 1.5–7.7)
NEUTROPHILS # BLD AUTO: 5.97 X10(3) UL (ref 1.5–7.7)
NEUTROPHILS NFR BLD AUTO: 76.3 %
OSMOLALITY SERPL CALC.SUM OF ELEC: 304 MOSM/KG (ref 275–295)
P AXIS: 59 DEGREES
P-R INTERVAL: 158 MS
PLATELET # BLD AUTO: 212 10(3)UL (ref 150–450)
POTASSIUM SERPL-SCNC: 3.9 MMOL/L (ref 3.5–5.1)
Q-T INTERVAL: 372 MS
QRS DURATION: 84 MS
QTC CALCULATION (BEZET): 474 MS
R AXIS: -8 DEGREES
RBC # BLD AUTO: 3.23 X10(6)UL (ref 3.8–5.3)
SODIUM SERPL-SCNC: 144 MMOL/L (ref 136–145)
T AXIS: 42 DEGREES
VENTRICULAR RATE: 98 BPM
WBC # BLD AUTO: 7.8 X10(3) UL (ref 4–11)

## 2025-06-26 PROCEDURE — 76000 FLUOROSCOPY <1 HR PHYS/QHP: CPT | Performed by: ORTHOPAEDIC SURGERY

## 2025-06-26 PROCEDURE — 94760 N-INVAS EAR/PLS OXIMETRY 1: CPT

## 2025-06-26 PROCEDURE — 93010 ELECTROCARDIOGRAM REPORT: CPT | Performed by: INTERNAL MEDICINE

## 2025-06-26 PROCEDURE — 83735 ASSAY OF MAGNESIUM: CPT | Performed by: HOSPITALIST

## 2025-06-26 PROCEDURE — 82962 GLUCOSE BLOOD TEST: CPT

## 2025-06-26 PROCEDURE — 0QSC04Z REPOSITION LEFT LOWER FEMUR WITH INTERNAL FIXATION DEVICE, OPEN APPROACH: ICD-10-PCS | Performed by: ORTHOPAEDIC SURGERY

## 2025-06-26 PROCEDURE — 93005 ELECTROCARDIOGRAM TRACING: CPT

## 2025-06-26 PROCEDURE — 85025 COMPLETE CBC W/AUTO DIFF WBC: CPT | Performed by: HOSPITALIST

## 2025-06-26 PROCEDURE — 80048 BASIC METABOLIC PNL TOTAL CA: CPT | Performed by: HOSPITALIST

## 2025-06-26 DEVICE — NCB CANCELLOUS SCREW 5.0 32 L=80
Type: IMPLANTABLE DEVICE | Site: FEMUR | Status: FUNCTIONAL
Brand: NCB®

## 2025-06-26 DEVICE — IMPLANTABLE DEVICE
Type: IMPLANTABLE DEVICE | Site: FEMUR | Status: FUNCTIONAL
Brand: NCB®

## 2025-06-26 DEVICE — NCB PP DIST FEM PLATE, L,12 H , L. 278MM
Type: IMPLANTABLE DEVICE | Site: FEMUR | Status: FUNCTIONAL
Brand: NCB®

## 2025-06-26 DEVICE — NCB SCREW 5.0   L = 46
Type: IMPLANTABLE DEVICE | Site: FEMUR | Status: FUNCTIONAL
Brand: NCB®

## 2025-06-26 DEVICE — IMPLANTABLE DEVICE
Type: IMPLANTABLE DEVICE | Site: FEMUR | Status: FUNCTIONAL
Brand: CABLE-READY®

## 2025-06-26 DEVICE — NCB SCREW 5.0   L = 36
Type: IMPLANTABLE DEVICE | Site: FEMUR | Status: FUNCTIONAL
Brand: NCB®

## 2025-06-26 DEVICE — NCB CANCELLOUS SCREW 5.0 32 L=75
Type: IMPLANTABLE DEVICE | Site: FEMUR | Status: FUNCTIONAL
Brand: NCB®

## 2025-06-26 DEVICE — NCB SCREW 5.0   L = 55
Type: IMPLANTABLE DEVICE | Site: FEMUR | Status: FUNCTIONAL
Brand: NCB®

## 2025-06-26 DEVICE — NCB LOCKING CAP
Type: IMPLANTABLE DEVICE | Site: FEMUR | Status: FUNCTIONAL
Brand: NCB®

## 2025-06-26 RX ORDER — HYDROMORPHONE HYDROCHLORIDE 1 MG/ML
0.4 INJECTION, SOLUTION INTRAMUSCULAR; INTRAVENOUS; SUBCUTANEOUS EVERY 2 HOUR PRN
Status: DISCONTINUED | OUTPATIENT
Start: 2025-06-26 | End: 2025-06-30

## 2025-06-26 RX ORDER — ONDANSETRON 2 MG/ML
4 INJECTION INTRAMUSCULAR; INTRAVENOUS EVERY 6 HOURS PRN
Status: DISCONTINUED | OUTPATIENT
Start: 2025-06-26 | End: 2025-06-26 | Stop reason: HOSPADM

## 2025-06-26 RX ORDER — HYDROCODONE BITARTRATE AND ACETAMINOPHEN 5; 325 MG/1; MG/1
1 TABLET ORAL ONCE AS NEEDED
Status: DISCONTINUED | OUTPATIENT
Start: 2025-06-26 | End: 2025-06-26 | Stop reason: HOSPADM

## 2025-06-26 RX ORDER — HYDROMORPHONE HYDROCHLORIDE 1 MG/ML
0.4 INJECTION, SOLUTION INTRAMUSCULAR; INTRAVENOUS; SUBCUTANEOUS EVERY 5 MIN PRN
Status: DISCONTINUED | OUTPATIENT
Start: 2025-06-26 | End: 2025-06-26 | Stop reason: HOSPADM

## 2025-06-26 RX ORDER — DOCUSATE SODIUM 100 MG/1
100 CAPSULE, LIQUID FILLED ORAL 2 TIMES DAILY
Status: DISCONTINUED | OUTPATIENT
Start: 2025-06-26 | End: 2025-07-01

## 2025-06-26 RX ORDER — HYDROMORPHONE HYDROCHLORIDE 1 MG/ML
0.6 INJECTION, SOLUTION INTRAMUSCULAR; INTRAVENOUS; SUBCUTANEOUS EVERY 5 MIN PRN
Status: DISCONTINUED | OUTPATIENT
Start: 2025-06-26 | End: 2025-06-26 | Stop reason: HOSPADM

## 2025-06-26 RX ORDER — ROCURONIUM BROMIDE 10 MG/ML
INJECTION, SOLUTION INTRAVENOUS AS NEEDED
Status: DISCONTINUED | OUTPATIENT
Start: 2025-06-26 | End: 2025-06-26 | Stop reason: SURG

## 2025-06-26 RX ORDER — METOPROLOL TARTRATE 25 MG/1
25 TABLET, FILM COATED ORAL
Status: DISCONTINUED | OUTPATIENT
Start: 2025-06-26 | End: 2025-06-27

## 2025-06-26 RX ORDER — SENNOSIDES 8.6 MG
17.2 TABLET ORAL NIGHTLY
Status: DISCONTINUED | OUTPATIENT
Start: 2025-06-26 | End: 2025-07-01

## 2025-06-26 RX ORDER — BISACODYL 10 MG
10 SUPPOSITORY, RECTAL RECTAL
Status: DISCONTINUED | OUTPATIENT
Start: 2025-06-26 | End: 2025-07-01

## 2025-06-26 RX ORDER — MEPERIDINE HYDROCHLORIDE 25 MG/ML
12.5 INJECTION INTRAMUSCULAR; INTRAVENOUS; SUBCUTANEOUS AS NEEDED
Status: DISCONTINUED | OUTPATIENT
Start: 2025-06-26 | End: 2025-06-26 | Stop reason: HOSPADM

## 2025-06-26 RX ORDER — HYDRALAZINE HYDROCHLORIDE 20 MG/ML
INJECTION INTRAMUSCULAR; INTRAVENOUS AS NEEDED
Status: DISCONTINUED | OUTPATIENT
Start: 2025-06-26 | End: 2025-06-26 | Stop reason: SURG

## 2025-06-26 RX ORDER — PHENYLEPHRINE HCL 10 MG/ML
VIAL (ML) INJECTION AS NEEDED
Status: DISCONTINUED | OUTPATIENT
Start: 2025-06-26 | End: 2025-06-26 | Stop reason: SURG

## 2025-06-26 RX ORDER — ONDANSETRON 2 MG/ML
4 INJECTION INTRAMUSCULAR; INTRAVENOUS EVERY 6 HOURS PRN
Status: DISCONTINUED | OUTPATIENT
Start: 2025-06-26 | End: 2025-07-01

## 2025-06-26 RX ORDER — METOCLOPRAMIDE HYDROCHLORIDE 5 MG/ML
5 INJECTION INTRAMUSCULAR; INTRAVENOUS EVERY 8 HOURS PRN
Status: DISCONTINUED | OUTPATIENT
Start: 2025-06-26 | End: 2025-07-01

## 2025-06-26 RX ORDER — TRANEXAMIC ACID 10 MG/ML
INJECTION, SOLUTION INTRAVENOUS AS NEEDED
Status: DISCONTINUED | OUTPATIENT
Start: 2025-06-26 | End: 2025-06-26 | Stop reason: SURG

## 2025-06-26 RX ORDER — ACETAMINOPHEN 500 MG
1000 TABLET ORAL EVERY 8 HOURS SCHEDULED
Status: DISCONTINUED | OUTPATIENT
Start: 2025-06-26 | End: 2025-07-01

## 2025-06-26 RX ORDER — HYDROMORPHONE HYDROCHLORIDE 1 MG/ML
0.2 INJECTION, SOLUTION INTRAMUSCULAR; INTRAVENOUS; SUBCUTANEOUS EVERY 2 HOUR PRN
Status: DISCONTINUED | OUTPATIENT
Start: 2025-06-26 | End: 2025-06-30

## 2025-06-26 RX ORDER — DEXAMETHASONE SODIUM PHOSPHATE 4 MG/ML
VIAL (ML) INJECTION AS NEEDED
Status: DISCONTINUED | OUTPATIENT
Start: 2025-06-26 | End: 2025-06-26 | Stop reason: SURG

## 2025-06-26 RX ORDER — CEFAZOLIN SODIUM 1 G/3ML
INJECTION, POWDER, FOR SOLUTION INTRAMUSCULAR; INTRAVENOUS AS NEEDED
Status: DISCONTINUED | OUTPATIENT
Start: 2025-06-26 | End: 2025-06-26 | Stop reason: SURG

## 2025-06-26 RX ORDER — DOXEPIN HYDROCHLORIDE 50 MG/1
1 CAPSULE ORAL DAILY
Status: DISCONTINUED | OUTPATIENT
Start: 2025-06-27 | End: 2025-07-01

## 2025-06-26 RX ORDER — OXYCODONE HYDROCHLORIDE 5 MG/1
5 TABLET ORAL EVERY 4 HOURS PRN
Status: DISCONTINUED | OUTPATIENT
Start: 2025-06-26 | End: 2025-07-01

## 2025-06-26 RX ORDER — MIDAZOLAM HYDROCHLORIDE 1 MG/ML
1 INJECTION INTRAMUSCULAR; INTRAVENOUS EVERY 5 MIN PRN
Status: DISCONTINUED | OUTPATIENT
Start: 2025-06-26 | End: 2025-06-26 | Stop reason: HOSPADM

## 2025-06-26 RX ORDER — HYDROCODONE BITARTRATE AND ACETAMINOPHEN 5; 325 MG/1; MG/1
2 TABLET ORAL ONCE AS NEEDED
Status: DISCONTINUED | OUTPATIENT
Start: 2025-06-26 | End: 2025-06-26 | Stop reason: HOSPADM

## 2025-06-26 RX ORDER — NALOXONE HYDROCHLORIDE 0.4 MG/ML
0.08 INJECTION, SOLUTION INTRAMUSCULAR; INTRAVENOUS; SUBCUTANEOUS AS NEEDED
Status: DISCONTINUED | OUTPATIENT
Start: 2025-06-26 | End: 2025-06-26 | Stop reason: HOSPADM

## 2025-06-26 RX ORDER — HYDROMORPHONE HYDROCHLORIDE 1 MG/ML
0.2 INJECTION, SOLUTION INTRAMUSCULAR; INTRAVENOUS; SUBCUTANEOUS EVERY 5 MIN PRN
Status: DISCONTINUED | OUTPATIENT
Start: 2025-06-26 | End: 2025-06-26 | Stop reason: HOSPADM

## 2025-06-26 RX ORDER — SODIUM CHLORIDE, SODIUM LACTATE, POTASSIUM CHLORIDE, CALCIUM CHLORIDE 600; 310; 30; 20 MG/100ML; MG/100ML; MG/100ML; MG/100ML
INJECTION, SOLUTION INTRAVENOUS CONTINUOUS PRN
Status: DISCONTINUED | OUTPATIENT
Start: 2025-06-26 | End: 2025-06-26 | Stop reason: SURG

## 2025-06-26 RX ORDER — LIDOCAINE HYDROCHLORIDE 10 MG/ML
INJECTION, SOLUTION EPIDURAL; INFILTRATION; INTRACAUDAL; PERINEURAL AS NEEDED
Status: DISCONTINUED | OUTPATIENT
Start: 2025-06-26 | End: 2025-06-26 | Stop reason: SURG

## 2025-06-26 RX ORDER — POLYETHYLENE GLYCOL 3350 17 G/17G
17 POWDER, FOR SOLUTION ORAL DAILY PRN
Status: DISCONTINUED | OUTPATIENT
Start: 2025-06-26 | End: 2025-07-01

## 2025-06-26 RX ORDER — ACETAMINOPHEN 500 MG
1000 TABLET ORAL ONCE AS NEEDED
Status: DISCONTINUED | OUTPATIENT
Start: 2025-06-26 | End: 2025-06-26 | Stop reason: HOSPADM

## 2025-06-26 RX ORDER — METOCLOPRAMIDE HYDROCHLORIDE 5 MG/ML
5 INJECTION INTRAMUSCULAR; INTRAVENOUS EVERY 8 HOURS PRN
Status: DISCONTINUED | OUTPATIENT
Start: 2025-06-26 | End: 2025-06-26 | Stop reason: HOSPADM

## 2025-06-26 RX ORDER — SODIUM PHOSPHATE, DIBASIC AND SODIUM PHOSPHATE, MONOBASIC 7; 19 G/230ML; G/230ML
1 ENEMA RECTAL ONCE AS NEEDED
Status: DISCONTINUED | OUTPATIENT
Start: 2025-06-26 | End: 2025-07-01

## 2025-06-26 RX ORDER — SODIUM CHLORIDE, SODIUM LACTATE, POTASSIUM CHLORIDE, CALCIUM CHLORIDE 600; 310; 30; 20 MG/100ML; MG/100ML; MG/100ML; MG/100ML
INJECTION, SOLUTION INTRAVENOUS CONTINUOUS
Status: DISCONTINUED | OUTPATIENT
Start: 2025-06-26 | End: 2025-06-26 | Stop reason: HOSPADM

## 2025-06-26 RX ADMIN — SODIUM CHLORIDE, SODIUM LACTATE, POTASSIUM CHLORIDE, CALCIUM CHLORIDE: 600; 310; 30; 20 INJECTION, SOLUTION INTRAVENOUS at 16:00:00

## 2025-06-26 RX ADMIN — CEFAZOLIN SODIUM 2 G: 1 INJECTION, POWDER, FOR SOLUTION INTRAMUSCULAR; INTRAVENOUS at 16:16:00

## 2025-06-26 RX ADMIN — LIDOCAINE HYDROCHLORIDE 50 MG: 10 INJECTION, SOLUTION EPIDURAL; INFILTRATION; INTRACAUDAL; PERINEURAL at 16:00:00

## 2025-06-26 RX ADMIN — ROCURONIUM BROMIDE 10 MG: 10 INJECTION, SOLUTION INTRAVENOUS at 17:44:00

## 2025-06-26 RX ADMIN — TRANEXAMIC ACID 1000 MG: 10 INJECTION, SOLUTION INTRAVENOUS at 16:44:00

## 2025-06-26 RX ADMIN — ONDANSETRON 4 MG: 2 INJECTION INTRAMUSCULAR; INTRAVENOUS at 18:20:00

## 2025-06-26 RX ADMIN — DEXAMETHASONE SODIUM PHOSPHATE 4 MG: 4 MG/ML VIAL (ML) INJECTION at 16:47:00

## 2025-06-26 RX ADMIN — ROCURONIUM BROMIDE 10 MG: 10 INJECTION, SOLUTION INTRAVENOUS at 16:53:00

## 2025-06-26 RX ADMIN — TRANEXAMIC ACID: 10 INJECTION, SOLUTION INTRAVENOUS at 18:41:00

## 2025-06-26 RX ADMIN — PHENYLEPHRINE HCL 100 MCG: 10 MG/ML VIAL (ML) INJECTION at 16:27:00

## 2025-06-26 RX ADMIN — ROCURONIUM BROMIDE 50 MG: 10 INJECTION, SOLUTION INTRAVENOUS at 16:00:00

## 2025-06-26 RX ADMIN — PHENYLEPHRINE HCL 100 MCG: 10 MG/ML VIAL (ML) INJECTION at 16:30:00

## 2025-06-26 RX ADMIN — PHENYLEPHRINE HCL 100 MCG: 10 MG/ML VIAL (ML) INJECTION at 17:19:00

## 2025-06-26 RX ADMIN — HYDRALAZINE HYDROCHLORIDE 10 MG: 20 INJECTION INTRAMUSCULAR; INTRAVENOUS at 18:33:00

## 2025-06-26 NOTE — ANESTHESIA PROCEDURE NOTES
Airway  Date/Time: 6/26/2025 4:53 PM  Reason: elective    Airway not difficult    General Information and Staff   Patient location during procedure: OR  Anesthesiologist: Triston Shay MD  Performed: anesthesiologist   Performed by: Triston Shay MD  Authorized by: Triston hSay MD        Indications and Patient Condition  Indications for airway management: anesthesia  Sedation level: deep      Preoxygenated: yesPatient position: sniffing    Mask difficulty assessment: 1 - vent by mask  Planned trial extubation    Final Airway Details    Final airway type: endotracheal airway    Successful airway: ETT  Cuffed: yes   Successful intubation technique: direct laryngoscopy  Endotracheal tube insertion site: oral  Blade: Sage  Blade size: #3  ETT size (mm): 7.0    Cormack-Lehane Classification: grade IIA - partial view of glottis  Placement verified by: capnometry   Measured from: lips  ETT to lips (cm): 19  Number of attempts at approach: 1

## 2025-06-26 NOTE — CONSULTS
[facility]  Report of Consultation    Miriam Contreras Patient Status:  Observation    1945 MRN [id number]   Location [facility] 3SW-A Attending Andrew Stinson MD   Hosp Day # 0 PCP Juanpablo Bowles MD     REASON FOR CONSULTATION:  Periprosthetic left femur fracture    CHIEF COMPLAINT:  Left knee pain    HISTORY OF PRESENT ILLNESS:    Miriam Contreras is a a(n) 79 year old female.  Patient fell from standing height at home yesterday.  She reports that she lost balance and fell.  She landed on her left side.  She had pain on the left knee.  ER evaluation showed a periprosthetic femur fracture.  She has history of left total knee replacement more than 10 years ago.  Pain is worse with any attempt of movement.  Denies any new back, hip, ankle pain.  No numbness or tingling sensations.  She is a baseline ambulator using a walker at home.          ROS:  Constitutional: Negative for fever, chills or sweats  Skin: Negative for abrasion, rash, laceration or puncture wound  HEENT: Negative for change in vision, blurred vision or loss of hearing  Respiratory: Negative for shortness of breath or cough  Cardiovascular: Negative for chest pain or palpitations  Gastrointestinal: Negative for nausea, vomiting or abdominal pain  Neurologic: Negative for loss of consciousness, altered mental status, change in speech or orientation  Musculoskeletal: Negative except for HPI     HISTORY:  Past Medical History[1]  Past Surgical History[2]  Family History[3]   reports that she quit smoking about 18 years ago. Her smoking use included cigarettes. She started smoking about 63 years ago. She has a 67.5 pack-year smoking history. She has never used smokeless tobacco. She reports that she does not currently use alcohol. She reports that she does not use drugs.    ALLERGIES:  Allergies[4]    MEDICATIONS:  Current Hospital Medications[5]    PHYSICAL EXAM:  Blood pressure 134/78, pulse 94, temperature 97.9 °F (36.6 °C),  temperature source Oral, resp. rate 18, height 5' 3\" (1.6 m), weight 150 lb (68 kg), SpO2 95%.    General: Alert, orientated x3.  Affect is normal.  In hospital bed.  No apparent distress.    Extremities:    Left lower extremity:  Hip has intact skin.  No tenderness to palpation.  Left thigh has mild swelling.  Intact skin.  Left knee skin is intact.  Incision is healed.  Her range of motion, strength, and stability could not be assessed due to fracture.  Left leg is soft.  Ankle is nontender.  Motor or sensory examination are intact.  Palpable dorsalis pedis pulses.    IMAGING:  X-ray left knee AP/lateral views show a displaced oblique supracondylar periprosthetic distal femur fracture.  Implant fixation be easily assessed.  However, there does appear to be good amount of distal femoral bone still attached mostly to the lateral side.  There is no gross implant loosening.  Component looks intact.    LABS:   Recent Labs   Lab 06/25/25  1323 06/26/25  0506   RBC 4.09 3.23*   HGB 13.3 10.5*   HCT 40.2 31.8*   MCV 98.3 98.5   MCH 32.5 32.5   MCHC 33.1 33.0   RDW 16.7 16.6   NEPRELIM 9.97* 5.97   WBC 12.5* 7.8   .0 212.0         Recent Labs   Lab 06/25/25  1323 06/26/25  0506   * 135*   BUN 22 24*   CREATSERUM 0.81 0.82   EGFRCR 73 72   CA 9.3 8.8   ALB 4.6  --     144   K 3.9 3.9    110   CO2 23.0 25.0   ALKPHO 91  --    AST 23  --    ALT 20  --    BILT 0.7  --    TP 7.3  --      No results for input(s): \"PTP\", \"INR\", \"PTT\" in the last 168 hours.      IMPRESSION AND PLAN:  Problem List[6]    Placed periprosthetic left distal femur fracture:  Her x-ray findings were discussed with the patient.  Treatment options were reviewed.  Conservative versus surgical interventions were explained.  I recommend open reduction internal fixation.  What this means was explained.  Recovery duration and process were discussed.  Realistic expectation after surgery and recovery were discussed.  Risks of treatment  including but not limited to infection, DVT/pulmonary embolism, failure of fixation, nonunion/malunion, nerve and blood vessel injuries, excessive bleeding, chronic pain, loss of function of the limb, as well as other potential medical complications were discussed.  All questions were encouraged and answered.    Plan for ORIF left distal periprosthetic femur fracture this afternoon.      Bunny Del Castillo MD  [facility]  6/26/2025  6:59 AM        [1]   Past Medical History:   Aortic atherosclerosis    on LDCT    Collagenous colitis    Coronary atherosclerosis    abn cgxt had cabg 10/18/18, Dr. Martines, Dr. Neves 1/24/20, 9/20    Cyst of left kidney    Diabetes mellitus (HCC)    Emphysema lung (HCC)    no symptoms 4/13/18, sees lung doc prn    Encounter for eye exam    Dr. Gordillo    Encounter for screening for lung cancer    CT with stable nodules, recheck in 10/21 ( she quit tob in 2007)    Exertional angina    Resolved last addressed  10/2/18 Per order 10/2/18    GERD (gastroesophageal reflux disease)    due to hiatal hernia, not bothersome 2013, 4/5/17    Herniated nucleus pulposus, L4-5 left    History of cardiovascular stress test    neg stress echo pre-cardiac rehab    History of normal resting EKG    HTN (hypertension)    Hyperlipidemia LDL goal <70    Hyperplastic colon polyp    x2, no need to repeat    Left-sided low back pain with left-sided sciatica    flare up 7/24/20    Living will in place    Lung nodules    Dr. Cervantes stable on CT 2012, new one 4/29/19, recheck 1 year    Obesity    Osteoarthritis    Other nephritis and nephropathy, not specified as acute or chronic, with specified pathological lesion in kidney    in high school    Personal history of poliomyelitis    no residual    Pneumonia, organism unspecified(486)    Polio (HCC)    no residual    Postmenopausal bleeding    endometrial bx neg with Dr. Menchaca had polyp removal    Screening for breast cancer    refuses mammos in future. no longer  needed over age 75    Screening for cardiovascular condition    EF 70%, normal CGXT    Screening for osteoporosis    normal, repeat in 2020, refused 2021    Sebaceous cyst    upper neck, present for years    Shingles    L upper back   [2]   Past Surgical History:  Procedure Laterality Date    Appendectomy  1956    Cabg, artery-vein, two  10/18/2018    Dr. Martines, Dr. Salmon    Colonoscopy  6-08, 6/7/18    Dr. Galvez, normal, repeat in 2018    Colonoscopy  10/7/20= Collagenous colitis    Repeat PRN    Hip surgery Left 02/28/2020    ORIF Dr. Piedra at [place], went home from hosp. using walker, had home PT    Hysteroscopy  2011    uterine polyp removed    Knee replacement surgery Left 9/5/14    Dr. Champion at [place], happy with results    Remv cataract extracap,insert lens  7/23/08    Performed by CT DOLAN at [facility]    Remv cataract extracap,insert lens  8/20/08    Performed by CT DOLAN at [facility] bilateral with IOL's    Revise thumb tendon Left 1970's    Special service or report Right     BUNIONECTOMY  AND HAMMER TOE REMOVAL   [3]   Family History  Problem Relation Age of Onset    Other (Other) Father         UROSEPSIS    Hypertension Mother     Other (Other) Mother         KIDNEY REMOVAL; OD ON PRESCRIPTION RX    Other (Other) Maternal Grandmother         PNEUMONIA    Other (Other) Maternal Grandfather         CVA    Heart Disorder Paternal Grandmother     Heart Disorder Paternal Grandfather     Obesity Paternal Grandfather     Other (Other) Son         GERD    Other (pancreatitis) Son         sphincter or Oddi    Other (knee arthritis) Son     Lipids Son 49        hypertriglyceridemia    Breast Cancer Paternal Aunt 52   [4]   Allergies  Allergen Reactions    Amlodipine SHORTNESS OF BREATH     Sweating and hair loss as well    Furadantin [Nitrofurantoin] NAUSEA ONLY     ITCHING    Pcn [Penicillins Cross Reactors] RASH and NAUSEA ONLY     ITCHING    Zyban  [Bupropion Hcl] RASH   [5]   Current Facility-Administered Medications:     aspirin DR tab 81 mg, 81 mg, Oral, Daily    lisinopril (Zestril) tab 10 mg, 10 mg, Oral, Daily    atorvastatin (Lipitor) tab 20 mg, 20 mg, Oral, Nightly    glucose (Dex4) 15 GM/59ML oral liquid 15 g, 15 g, Oral, Q15 Min PRN **OR** glucose (Glutose) 40% oral gel 15 g, 15 g, Oral, Q15 Min PRN **OR** glucose-vitamin C (Dex-4) chewable tab 4 tablet, 4 tablet, Oral, Q15 Min PRN **OR** dextrose 50% injection 50 mL, 50 mL, Intravenous, Q15 Min PRN **OR** glucose (Dex4) 15 GM/59ML oral liquid 30 g, 30 g, Oral, Q15 Min PRN **OR** glucose (Glutose) 40% oral gel 30 g, 30 g, Oral, Q15 Min PRN **OR** glucose-vitamin C (Dex-4) chewable tab 8 tablet, 8 tablet, Oral, Q15 Min PRN    heparin (Porcine) 5000 UNIT/ML injection 5,000 Units, 5,000 Units, Subcutaneous, Q8H TERRY    acetaminophen (Tylenol Extra Strength) tab 500 mg, 500 mg, Oral, Q4H PRN    morphINE PF 2 MG/ML injection 1 mg, 1 mg, Intravenous, Q2H PRN **OR** morphINE PF 2 MG/ML injection 2 mg, 2 mg, Intravenous, Q2H PRN **OR** morphINE PF 4 MG/ML injection 4 mg, 4 mg, Intravenous, Q2H PRN    ondansetron (Zofran) 4 MG/2ML injection 4 mg, 4 mg, Intravenous, Q6H PRN    metoclopramide (Reglan) 5 mg/mL injection 5 mg, 5 mg, Intravenous, Q8H PRN    insulin aspart (NovoLOG) 100 Units/mL FlexPen 1-5 Units, 1-5 Units, Subcutaneous, TID AC and HS    acetaminophen (Tylenol) tab 650 mg, 650 mg, Oral, Q4H PRN **OR** HYDROcodone-acetaminophen (Norco) 5-325 MG per tab 1 tablet, 1 tablet, Oral, Q4H PRN **OR** HYDROcodone-acetaminophen (Norco) 5-325 MG per tab 2 tablet, 2 tablet, Oral, Q4H PRN  [6]   Patient Active Problem List  Diagnosis    Lung nodules    Height loss    Hyperlipidemia associated with type 2 diabetes mellitus (HCC)    Nonproliferative diabetic retinopathy (HCC)    Aortic atherosclerosis    Pulmonary scarring    PAD (peripheral artery disease)    Essential hypertension    Bronchiectasis without  complication (HCC)    Pulmonary emphysema, unspecified emphysema type (HCC)    CAD (coronary artery disease)    Hx of CABG    Type 2 diabetes mellitus with diabetic peripheral angiopathy without gangrene, without long-term current use of insulin (HCC)    Type 2 diabetes mellitus with microalbuminuria, without long-term current use of insulin (HCC)    Periprosthetic fracture of femur at tip of prosthesis, initial encounter

## 2025-06-26 NOTE — PLAN OF CARE
Patient alert and oriented x4. VSS on 3L O2 NC. Tele NSR-ST. Pain controlled with IV PRN pain meds. Denies n/t. Knee immobilizer to LLE. Rolling side to side. DTV 0030. Tolerating diet, no c/o n/v.     Plan: cardiology to see in am, OR with Dr. Del Castillo tomorrow, pain control

## 2025-06-26 NOTE — OPERATIVE REPORT
[facility]  Report of Surgery    Miriam Contreras Patient Status:  Observation    1945 MRN [id number]   Location [facility] SURGERY Attending Andrew Stinson MD   Hosp Day # 0 PCP Juanpablo Bowles MD         Preoperative diagnosis: Periprosthetic left distal femur fracture  Postoperative diagnosis: Same  Procedure: Open reduction internal fixation left distal periprosthetic femur fracture  Surgeon: Bunny Del Castillo MD  First Assistant: Roger Tuttle CSA  First assistant was necessary for patient positioning, wound retraction, fracture reduction, implant insertion, wound closure.  Anesthesia: General anesthesia  EBL: 500 mL  Complications: None  Implant: Yanely NCB 12 hole plate    Patient was brought to the operating room.  Anesthesia was given.  Arms were positioned by anesthesia.  Right leg had SCD applied and was cushioned.  Left leg was prepped and draped in sterile fashion all the way up to proximal thigh.  Fluoroscopy was done from hip joint down to the knee.  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  It appeared to have hip screw that was protruding through the femoral head.  Evaluation of the fracture showed an oblique fracture with what appeared to be generally stable femoral component.  Preoperative antibiotic was given.  TXA was given.  Timeout was performed.    Long lateral incision was made along the lateral aspect of the thigh.  Sharp dissection through the fascial layer was made.  Vastus lateralis was identified.  Dividing along the posterior aspect of the vastus lateralis, we reflected the muscle anteriorly.  Fracture site was easily identified.  Fracture reduction looked reasonable and fluoroscopy views.  Proximally there was a distal locking screw with the short intramedullary wes.  Using [employer] screwdriver, we took out the locking screw.  I trialed several plate lengths.  I felt that the and see the 12 hole was the most appropriate length plate.  The  plate was provisionally applied laterally with wires placed proximally and distally to hold temporary fixation.  Fluoroscopy views were done to assess the appropriateness of the plate placement.  Appeared to be satisfactory.  I put in a midshaft diaphyseal screw to reduce the plate to the lateral cortex of the femur.  This was done under fluoroscopic guidance.  I placed 5 metaphyseal screws.  3 of these were locked with a locking cap.  Proximally I placed 3 diaphyseal screws in total.  These were not locked.  Proximally I put 2 cables.  I felt that the overlap and the intramedullary wes was appropriate.  Fluoroscopy was used to assess hardware placement throughout the case.  Hemostasis was obtained.  Wound was irrigated copiously.  Fascial layer was closed.  Subcu layer was closed using multiple layers.  Skin was closed.  Sterile dressing was applied.    Bunny Del Castillo MD

## 2025-06-26 NOTE — PLAN OF CARE
Patient is alert and oriented x4. VSS on RA. Pain controlled. No complaint of numbness or tingling. Pulses intact. Plantar and dorsi flexions intact bilaterally. Motor strength intact but weak d/t pain. IS and ankle pumps encouraged. Belongings within reach. Encouraged to call for any needs.

## 2025-06-26 NOTE — PLAN OF CARE
Alert and oriented x 4, forgetful at times. VSS; on 3-4L O2 NC. Pain controlled with PRN medication. Immobilizer in place to LLE. NPO since midnight for surgery today. POC discussed with patient. Safety precautions in place. Call light within reach.

## 2025-06-26 NOTE — ANESTHESIA POSTPROCEDURE EVALUATION
[facility]    Miriam Contreras Patient Status:  Observation   Age/Gender 79 year old female MRN [id number]   Location [facility] SURGERY Attending Andrew Stinson MD   Hosp Day # 0 PCP Juanpablo Bowles MD       Anesthesia Post-op Note    OPEN REDUCTION INTERNAL FIXATION LEFT DISTAL PERIPROSTETIC FEMUR FRACTURE    Procedure Summary       Date: 06/26/25 Room / Location:  MAIN OR [address] [place] [address] MAIN OR    Anesthesia Start: 1557 Anesthesia Stop: 1841    Procedure: OPEN REDUCTION INTERNAL FIXATION LEFT DISTAL PERIPROSTETIC FEMUR FRACTURE (Left: Lower Leg) Diagnosis: (periprosthetic femur fracture)    Surgeons: Bunny Del Castillo MD Anesthesiologist: Triston Shay MD    Anesthesia Type: general ASA Status: 2            Anesthesia Type: general    Vitals Value Taken Time   /78 06/26/25 18:41   Temp 98.3 °F (36.8 °C) 06/26/25 18:41   Pulse 116 06/26/25 18:41   Resp 16 06/26/25 18:41   SpO2 96 % 06/26/25 18:41           Patient Location: PACU    Anesthesia Type: general    Airway Patency: patent    Postop Pain Control: adequate    Mental Status: mildly sedated but able to meaningfully participate in the post-anesthesia evaluation    Nausea/Vomiting: none    Cardiopulmonary/Hydration status: stable euvolemic    Complications: no apparent anesthesia related complications    Postop vital signs: stable    Dental Exam: Unchanged from Preop    Patient to be discharged from PACU when criteria met.

## 2025-06-26 NOTE — CM/SW NOTE
06/26/25 0900   CM/SW Referral Data   Referral Source Social Work (self-referral)   Reason for Referral Discharge planning;Other  (SDOH)   Informant EMR;Clinical Staff Member   Patient Info   Patient's Current Mental Status at Time of Assessment Alert;Oriented   Patient lives with Alone   Discharge Needs   Anticipated D/C needs Subacute rehab;Transportation services       Orders received for DC planning and SDOH. Chart reviewed. Patient is an 80 y/o woman admitted with distal femur periprosthetic fracture s/p fall. Plan for OR later today. Per ED CM notes, pt lives alone and there are concerns about her ability to care for self independently at home. Resources for additional home support were given. Anticipate pt will need ANA at DC. Insurance auth will be needed. Await post op MD and therapy recommendations for further DC planning. / to remain available for support and/or discharge planning.     Monica Bullock, [facility]  Discharge Planner  [phone]

## 2025-06-26 NOTE — ANESTHESIA PREPROCEDURE EVALUATION
PRE-OP EVALUATION    Patient Name: Miriam Contreras    Admit Diagnosis: Periprosthetic fracture of femur at tip of prosthesis, initial encounter [M97.8XXA, Z96.649]    Pre-op Diagnosis: INPATIENT    OPEN REDUCTION INTERNAL FIXATION LEFT DISTAL PERIPROSTETIC FEMUR FRACTURE    Anesthesia Procedure: OPEN REDUCTION INTERNAL FIXATION LEFT DISTAL PERIPROSTETIC FEMUR FRACTURE (Left)    Surgeons and Role:     * Bunny Del Castillo MD - Primary    Pre-op vitals reviewed.  Temp: 98 °F (36.7 °C)  Pulse: 96  Resp: 16  BP: 133/64  SpO2: 94 %  Body mass index is 26.57 kg/m².    Current medications reviewed.  Hospital Medications:  Current Medications[1]    Outpatient Medications:   Prescriptions Prior to Admission[2]    Allergies: Amlodipine, Furadantin [nitrofurantoin], Pcn [penicillins cross reactors], and Zyban [bupropion hcl]      Anesthesia Evaluation    Patient summary reviewed.    Anesthetic Complications           GI/Hepatic/Renal      (+) GERD                           Cardiovascular                  (+) hypertension     (+) CAD                                Endo/Other      (+) diabetes                            Pulmonary        (+) COPD                   Neuro/Psych                 (+) neuromuscular disease                     Past Surgical History[3]  Social Hx on file[4]  History   Drug Use No     Available pre-op labs reviewed.  Lab Results   Component Value Date    WBC 7.8 06/26/2025    RBC 3.23 (L) 06/26/2025    HGB 10.5 (L) 06/26/2025    HCT 31.8 (L) 06/26/2025    MCV 98.5 06/26/2025    MCH 32.5 06/26/2025    MCHC 33.0 06/26/2025    RDW 16.6 06/26/2025    .0 06/26/2025     Lab Results   Component Value Date     06/26/2025    K 3.9 06/26/2025     06/26/2025    CO2 25.0 06/26/2025    BUN 24 (H) 06/26/2025    CREATSERUM 0.82 06/26/2025     (H) 06/26/2025    CA 8.8 06/26/2025            Airway      Mallampati: II  Mouth opening: >3 FB  TM distance: > 6 cm  Neck ROM: full  Cardiovascular    Cardiovascular exam normal.  Rhythm: regular  Rate: normal     Dental             Pulmonary    Pulmonary exam normal.                 Other findings              ASA: 2   Plan: general  NPO status verified and patient meets guidelines.          Plan/risks discussed with: patient and significant other                Present on Admission:  **None**             [1]    metoprolol tartrate (Lopressor) tab 25 mg  25 mg Oral TID Beta Blocker/Cardiac    [COMPLETED] acetaminophen (Tylenol Extra Strength) tab 1,000 mg  1,000 mg Oral Once    [COMPLETED] morphINE PF 4 MG/ML injection 4 mg  4 mg Intravenous Once    [COMPLETED] ondansetron (Zofran) 4 MG/2ML injection 4 mg  4 mg Intravenous Once    aspirin DR tab 81 mg  81 mg Oral Daily    lisinopril (Zestril) tab 10 mg  10 mg Oral Daily    atorvastatin (Lipitor) tab 20 mg  20 mg Oral Nightly    glucose (Dex4) 15 GM/59ML oral liquid 15 g  15 g Oral Q15 Min PRN    Or    glucose (Glutose) 40% oral gel 15 g  15 g Oral Q15 Min PRN    Or    glucose-vitamin C (Dex-4) chewable tab 4 tablet  4 tablet Oral Q15 Min PRN    Or    dextrose 50% injection 50 mL  50 mL Intravenous Q15 Min PRN    Or    glucose (Dex4) 15 GM/59ML oral liquid 30 g  30 g Oral Q15 Min PRN    Or    glucose (Glutose) 40% oral gel 30 g  30 g Oral Q15 Min PRN    Or    glucose-vitamin C (Dex-4) chewable tab 8 tablet  8 tablet Oral Q15 Min PRN    heparin (Porcine) 5000 UNIT/ML injection 5,000 Units  5,000 Units Subcutaneous Q8H TERRY    acetaminophen (Tylenol Extra Strength) tab 500 mg  500 mg Oral Q4H PRN    morphINE PF 2 MG/ML injection 1 mg  1 mg Intravenous Q2H PRN    Or    morphINE PF 2 MG/ML injection 2 mg  2 mg Intravenous Q2H PRN    Or    morphINE PF 4 MG/ML injection 4 mg  4 mg Intravenous Q2H PRN    ondansetron (Zofran) 4 MG/2ML injection 4 mg  4 mg Intravenous Q6H PRN    metoclopramide (Reglan) 5 mg/mL injection 5 mg  5 mg Intravenous Q8H PRN    insulin aspart (NovoLOG) 100 Units/mL FlexPen 1-5  Units  1-5 Units Subcutaneous TID AC and HS    acetaminophen (Tylenol) tab 650 mg  650 mg Oral Q4H PRN    Or    HYDROcodone-acetaminophen (Norco) 5-325 MG per tab 1 tablet  1 tablet Oral Q4H PRN    Or    HYDROcodone-acetaminophen (Norco) 5-325 MG per tab 2 tablet  2 tablet Oral Q4H PRN   [2]   Medications Prior to Admission   Medication Sig Dispense Refill Last Dose/Taking    aspirin 81 MG Oral Tab EC Take 1 tablet (81 mg total) by mouth daily.   Past Week    lisinopril 20 MG Oral Tab Take 1 tablet (20 mg total) by mouth daily.   Taking    metoprolol succinate ER 25 MG Oral Tablet 24 Hr Take 1 tablet (25 mg total) by mouth daily.   Taking    metFORMIN 500 MG Oral Tab Take 100 mg by mouth daily with breakfast.   Taking    diclofenac 75 MG Oral Tab EC Take 1 tablet (75 mg total) by mouth 2 (two) times daily.   Taking    HYDROcodone-acetaminophen 5-325 MG Oral Tab Take 1 tablet by mouth every 8 (eight) hours as needed for Pain.   6/25/2025 Morning    simvastatin 40 MG Oral Tab Take 1 tablet (40 mg total) by mouth daily. 90 tablet 4 Taking    MULTI-VITAMIN/MINERALS OR TABS Take 1 tablet by mouth daily.   Taking   [3]   Past Surgical History:  Procedure Laterality Date    Appendectomy  1956    Cabg, artery-vein, two  10/18/2018    Dr. Martines, Dr. Salmon    Colonoscopy  6-08, 6/7/18    Dr. Galvez, normal, repeat in 2018    Colonoscopy  10/7/20= Collagenous colitis    Repeat PRN    Hip surgery Left 02/28/2020    ORIF Dr. Piedra at [place], went home from hosp. using walker, had home PT    Hysteroscopy  2011    uterine polyp removed    Knee replacement surgery Left 9/5/14    Dr. Champion at [place], happy with results    Remv cataract extracap,insert lens  7/23/08    Performed by CT DOLAN at [facility]    Remv cataract extracap,insert lens  8/20/08    Performed by CT DOLAN at [facility] bilateral with IOL's    Revise thumb tendon Left 1970's    Special service or report Right      BUNIONECTOMY  AND HAMMER TOE REMOVAL   [4]   Social History  Socioeconomic History    Marital status:     Number of children: 4   Occupational History    Occupation: retired   Tobacco Use    Smoking status: Former     Current packs/day: 0.00     Average packs/day: 1.5 packs/day for 45.0 years (67.5 ttl pk-yrs)     Types: Cigarettes     Start date: 1962     Quit date: 2007     Years since quittin.1    Smokeless tobacco: Never    Tobacco comments:     discussed LDCT 19, she'll consider. agreeable 20, 7/15/21   Vaping Use    Vaping status: Never Used   Substance and Sexual Activity    Alcohol use: Not Currently    Drug use: No    Sexual activity: Yes     Partners: Male   Other Topics Concern     Service No    Blood Transfusions No    Exercise Yes     Comment: impaired by back issues, 20    Seat Belt Yes    Self-Exams No     Comment: again 11, 13, 3/27/14, 3/30/15, 16, 17, 18, 19, 20

## 2025-06-26 NOTE — PROGRESS NOTES
[facility] Hospitalist Progress Note                                                                     [facility]   part of [facility]      Miriam Contreras  8/31/1945    SUBJECTIVE: patient denies any chest pain, palpitations shortness of breath, cough, nausea or vomiting. Pt with continued leg pain but pain meds helps.     OBJECTIVE:  Temp:  [97.3 °F (36.3 °C)-98.1 °F (36.7 °C)] 98 °F (36.7 °C)  Pulse:  [] 96  Resp:  [13-26] 16  BP: (118-190)/(64-98) 133/64  SpO2:  [92 %-100 %] 94 %  Exam  Gen: No acute distress, alert and oriented  Pulm: Lungs clear bilaterally, normal respiratory effort, no crackles, no wheezing  CV: Heart with regular rate and rhythm, no murmur.   Abd: Abdomen soft, nontender, nondistended, bowel sounds present  MSK: no significant pitting edema or tenderness of the LE  Skin: no new rashes or lesions    Labs:   Recent Labs   Lab 06/25/25  1323 06/26/25  0506   WBC 12.5* 7.8   HGB 13.3 10.5*   MCV 98.3 98.5   .0 212.0       Recent Labs   Lab 06/25/25  1323 06/26/25  0506    144   K 3.9 3.9    110   CO2 23.0 25.0   BUN 22 24*   CREATSERUM 0.81 0.82   CA 9.3 8.8   MG  --  1.8   * 135*       Recent Labs   Lab 06/25/25  1323   ALT 20   AST 23   ALB 4.6       Recent Labs   Lab 06/25/25  1933 06/25/25  2238 06/26/25  0522 06/26/25  1208   PGLU 120* 152* 135* 121*       Meds:   Scheduled: Scheduled Medications[1]  Continuous Infusions: Medication Infusions[2]  PRN: PRN Medications[3]    ASSESSMENT / PLAN:   79 year old female with history of coronary disease with bypass, type 2 diabetes, emphysema, GERD, hypertension, hyperlipidemia, osteoarthritis, history of polio presenting with left lower extremity pain.     Left Femoral fracture  -ortho consulted--> OR today   -prn pain meds  -npo for surgery     Fall  -mechanical  -no further eval needed at this time     CAD hx  -asa  -atorvastatin      HTN  -sbp  stable  -lisinopril     HLD  -atorvastatin      Type 2 DM  -hold home meds  -low dose insulin sliding scale    Hypoxia  -likely secondary to atelectasis  -encourage IS  -titrate o2 as tolerated     Preop eval  -no active pulm or cardiac complaints  -check cxr--> no significant pathology of concern  -pt activity tolerance limited per patient more from joint pains then anything else  -no further inpt evaluation or treatment needed prior to surgical intervention. Pt would be moderate risk for low to moderate risk surgical intervention. Pt and family made aware. Pt would like to proceed with surgery.      Quality:  DVT Prophylaxis: scd, heparin sq  CODE status: Full   Banda: none     Plan of care discussed with patient and staff     Dispo: no discharge     Andrew Stinson MD  [facility] Hospitalist  [phone]           [1]    aspirin  81 mg Oral Daily    lisinopril  10 mg Oral Daily    atorvastatin  20 mg Oral Nightly    heparin  5,000 Units Subcutaneous Q8H TERRY    insulin aspart  1-5 Units Subcutaneous TID AC and HS   [2] [3]   glucose **OR** glucose **OR** glucose-vitamin C **OR** dextrose **OR** glucose **OR** glucose **OR** glucose-vitamin C    acetaminophen    morphINE **OR** morphINE **OR** morphINE    ondansetron    metoclopramide    acetaminophen **OR** HYDROcodone-acetaminophen **OR** HYDROcodone-acetaminophen

## 2025-06-26 NOTE — PHYSICAL THERAPY NOTE
PT orders received, patient's chart reviewed. Admitted for falls and is positive for displaced oblique supracondylar periprosthetic distal femur fracture.     Patient with planned L ORIF L Distal Periprosthetic femur fracture in the afternoon. Will NNO post OP. PT will continue to follow.

## 2025-06-26 NOTE — OCCUPATIONAL THERAPY NOTE
OT orders received, pt chart reviewed. Pt admitted for fall, imaging + for displaced oblique supracondylar periprosthetic distal femur fracture; per ortho note, pt with planned L ORIF left distal periprosthetic femur fracture this afternoon. Will NNO post op; OT will continue to follow.

## 2025-06-26 NOTE — CONSULTS
[facility] Consultation    Miriam Contreras Patient Status:  Observation    1945 MRN [id number]   Location [facility] 3SW-A Attending Andrew Stinson MD   Hosp Day # 0 PCP Juanpablo Bowles MD     Reason for Consultation:  pre-op CV clearance      History of Present Illness:  Miriam Contreras is a a(n) 79 year old female. She has CAD, s/p CABG  2018 to RCA, LAD; Hypertension ; Diabetes. She fell yesterday from standing position.  ER eval reveals  a periprosthetic femur fracture.  She has history of left total knee replacement more than 10 years ago.  She had a Lexiscan cardiolyte stress test 2022 for dyspnea and  chest pain. The study suggested inferior ischemia. Her chest pain had improived after resuming the asa she had stopped.  She chose to avoid any additional coronary anatomy evaluation after the Lexiscan cardiolyte stress test.  Per son she does get winded with walking outdoors.  The pt denies chest pain.    History:  Past Medical History[1]  Past Surgical History[2]  Family History[3]      Allergies:  Allergies[4]    Medications:  Scheduled Medications[5]    Continuous Infusions:  Medication Infusions[6]    Social History:   reports that she quit smoking about 18 years ago. Her smoking use included cigarettes. She started smoking about 63 years ago. She has a 67.5 pack-year smoking history. She has never used smokeless tobacco. She reports that she does not currently use alcohol. She reports that she does not use drugs.    Review of Systems:  All systems were reviewed and are negative except as described above in HPI.    Physical Exam:      Wt Readings from Last 3 Encounters:   25 150 lb (68 kg)   22 173 lb (78.5 kg)   21 173 lb (78.5 kg)       Vitals:    25 0300 25 0400 25 0800 25 1200   BP:  134/78 124/66 133/64   BP Location:  Left arm Left arm Left arm   Pulse: 103 94 104 96   Resp:  18 16 16   Temp:  97.9 °F (36.6 °C) 98.1 °F (36.7 °C) 98 °F (36.7  °C)   TempSrc:  Oral Oral Oral   SpO2: 94% 95% 95% 94%   Weight:       Height:           Temp:  [97.3 °F (36.3 °C)-98.1 °F (36.7 °C)] 98 °F (36.7 °C)  Pulse:  [] 96  Resp:  [13-26] 16  BP: (118-190)/(64-98) 133/64  SpO2:  [92 %-100 %] 94 %    Temp: 98 °F (36.7 °C)  Pulse: 96  Resp: 16  BP: 133/64      General:  Appears comfortable  HEENT: No focal deficits.  Neck: No JVD, carotids 2+ no bruits.  Cardiac: Regular S1S2.  No S3, S4, rub, click.  No murmur.  Lungs: Clear to auscultation and percussion.  Abdomen: Soft, non-tender.   Extremities: No LE edema.  No clubbing or cyanosis  Neurologic: Alert and oriented, normal affect.  Skin: Warm and dry.     Labs:      HEM:  Recent Labs   Lab 06/25/25  1323 06/26/25  0506   WBC 12.5* 7.8   HGB 13.3 10.5*   HCT 40.2 31.8*   .0 212.0       Chem:  Recent Labs   Lab 06/25/25  1323 06/26/25  0506    144   K 3.9 3.9    110   CO2 23.0 25.0   BUN 22 24*   CREATSERUM 0.81 0.82   MG  --  1.8   ALT 20  --    AST 23  --    ALB 4.6  --        No results for input(s): \"INR\" in the last 168 hours.                  Lab Results   Component Value Date    TROP <0.017 09/14/2018         Invalid input(s): \"PBNPML\"                 Telemetry:     Laboratories and Data:  Diagnostics:    EKG, 6/26/2025:  NSR, NSTW changes    CXR, 6/26/2025:            Impression:    1.  Fall with R femur fx, 6/25/25  2. CAD, s/p 2 vessel CABG (RCA, LAD), 2018. Asymptomatic.. on asa, statin  3. Hypertension .   4. Diabetes  5. Dyslipidemia     She is at moderately elevated CV risk with upcomming surgery given age, CAD hx, hx of dyspnea, hx of ischemia on Lexiscan cardiolyte stress test 2022. I do not believe it is feasible to delay surgery with invasive coronary procedures .    Recommend:    1.  Monitor cardiac rhythm through manisha-op period  2. Will resume metoprolol  3. Continue aspirin            Matthew Broussard MD  6/26/2025  1:13 PM         [1]   Past Medical History:   Aortic  atherosclerosis    on LDCT    Collagenous colitis    Coronary atherosclerosis    abn cgxt had cabg 10/18/18, Dr. Martines, Dr. Neves 1/24/20, 9/20    Cyst of left kidney    Diabetes mellitus (HCC)    Emphysema lung (HCC)    no symptoms 4/13/18, sees lung doc prn    Encounter for eye exam    Dr. Gordillo    Encounter for screening for lung cancer    CT with stable nodules, recheck in 10/21 ( she quit tob in 2007)    Exertional angina    Resolved last addressed  10/2/18 Per order 10/2/18    GERD (gastroesophageal reflux disease)    due to hiatal hernia, not bothersome 2013, 4/5/17    Herniated nucleus pulposus, L4-5 left    History of cardiovascular stress test    neg stress echo pre-cardiac rehab    History of normal resting EKG    HTN (hypertension)    Hyperlipidemia LDL goal <70    Hyperplastic colon polyp    x2, no need to repeat    Left-sided low back pain with left-sided sciatica    flare up 7/24/20    Living will in place    Lung nodules    Dr. Cervantes stable on CT 2012, new one 4/29/19, recheck 1 year    Obesity    Osteoarthritis    Other nephritis and nephropathy, not specified as acute or chronic, with specified pathological lesion in kidney    in high school    Personal history of poliomyelitis    no residual    Pneumonia, organism unspecified(486)    Polio (HCC)    no residual    Postmenopausal bleeding    endometrial bx neg with Dr. Menchaca had polyp removal    Screening for breast cancer    refuses mammos in future. no longer needed over age 75    Screening for cardiovascular condition    EF 70%, normal CGXT    Screening for osteoporosis    normal, repeat in 2020, refused 2021    Sebaceous cyst    upper neck, present for years    Shingles    L upper back   [2]   Past Surgical History:  Procedure Laterality Date    Appendectomy  1956    Cabg, artery-vein, two  10/18/2018    Dr. Martines, Dr. Salmon    Colonoscopy  6-08, 6/7/18    Dr. Galvez, normal, repeat in 2018    Colonoscopy  10/7/20= Collagenous colitis     Repeat PRN    Hip surgery Left 02/28/2020    ORIF Dr. Piedra at [facility], went home from hosp. using walker, had home PT    Hysteroscopy  2011    uterine polyp removed    Knee replacement surgery Left 9/5/14    Dr. Champion at [place], happy with results    Remv cataract extracap,insert lens  7/23/08    Performed by CT DOLAN at [facility], [facility]    Remv cataract extracap,insert lens  8/20/08    Performed by CT DOLAN at [facility], [facility] bilateral with IOL's    Revise thumb tendon Left 1970's    Special service or report Right     BUNIONECTOMY  AND HAMMER TOE REMOVAL   [3]   Family History  Problem Relation Age of Onset    Other (Other) Father         UROSEPSIS    Hypertension Mother     Other (Other) Mother         KIDNEY REMOVAL; OD ON PRESCRIPTION RX    Other (Other) Maternal Grandmother         PNEUMONIA    Other (Other) Maternal Grandfather         CVA    Heart Disorder Paternal Grandmother     Heart Disorder Paternal Grandfather     Obesity Paternal Grandfather     Other (Other) Son         GERD    Other (pancreatitis) Son         sphincter or Oddi    Other (knee arthritis) Son     Lipids Son 49        hypertriglyceridemia    Breast Cancer Paternal Aunt 52   [4]   Allergies  Allergen Reactions    Amlodipine SHORTNESS OF BREATH     Sweating and hair loss as well    Furadantin [Nitrofurantoin] NAUSEA ONLY     ITCHING    Pcn [Penicillins Cross Reactors] RASH and NAUSEA ONLY     ITCHING    Zyban [Bupropion Hcl] RASH   [5]    aspirin  81 mg Oral Daily    lisinopril  10 mg Oral Daily    atorvastatin  20 mg Oral Nightly    heparin  5,000 Units Subcutaneous Q8H [facility]    insulin aspart  1-5 Units Subcutaneous TID AC and HS   [6]

## 2025-06-27 LAB
ANION GAP SERPL CALC-SCNC: 11 MMOL/L (ref 0–18)
BASOPHILS # BLD AUTO: 0.02 X10(3) UL (ref 0–0.2)
BASOPHILS NFR BLD AUTO: 0.2 %
BUN BLD-MCNC: 23 MG/DL (ref 9–23)
CALCIUM BLD-MCNC: 8.6 MG/DL (ref 8.7–10.6)
CHLORIDE SERPL-SCNC: 109 MMOL/L (ref 98–112)
CO2 SERPL-SCNC: 23 MMOL/L (ref 21–32)
CREAT BLD-MCNC: 0.79 MG/DL (ref 0.55–1.02)
EGFRCR SERPLBLD CKD-EPI 2021: 76 ML/MIN/1.73M2 (ref 60–?)
EOSINOPHIL # BLD AUTO: 0 X10(3) UL (ref 0–0.7)
EOSINOPHIL NFR BLD AUTO: 0 %
ERYTHROCYTE [DISTWIDTH] IN BLOOD BY AUTOMATED COUNT: 16.4 %
GLUCOSE BLD-MCNC: 142 MG/DL (ref 70–99)
GLUCOSE BLD-MCNC: 143 MG/DL (ref 70–99)
GLUCOSE BLD-MCNC: 152 MG/DL (ref 70–99)
GLUCOSE BLD-MCNC: 182 MG/DL (ref 70–99)
GLUCOSE BLD-MCNC: 210 MG/DL (ref 70–99)
HCT VFR BLD AUTO: 29.5 % (ref 35–48)
HGB BLD-MCNC: 9.5 G/DL (ref 12–16)
IMM GRANULOCYTES # BLD AUTO: 0.04 X10(3) UL (ref 0–1)
IMM GRANULOCYTES NFR BLD: 0.5 %
LYMPHOCYTES # BLD AUTO: 0.83 X10(3) UL (ref 1–4)
LYMPHOCYTES NFR BLD AUTO: 9.9 %
MAGNESIUM SERPL-MCNC: 1.8 MG/DL (ref 1.6–2.6)
MCH RBC QN AUTO: 32.8 PG (ref 26–34)
MCHC RBC AUTO-ENTMCNC: 32.2 G/DL (ref 31–37)
MCV RBC AUTO: 101.7 FL (ref 80–100)
MONOCYTES # BLD AUTO: 0.72 X10(3) UL (ref 0.1–1)
MONOCYTES NFR BLD AUTO: 8.6 %
NEUTROPHILS # BLD AUTO: 6.74 X10 (3) UL (ref 1.5–7.7)
NEUTROPHILS # BLD AUTO: 6.74 X10(3) UL (ref 1.5–7.7)
NEUTROPHILS NFR BLD AUTO: 80.8 %
OSMOLALITY SERPL CALC.SUM OF ELEC: 302 MOSM/KG (ref 275–295)
PLATELET # BLD AUTO: 192 10(3)UL (ref 150–450)
POTASSIUM SERPL-SCNC: 3.9 MMOL/L (ref 3.5–5.1)
RBC # BLD AUTO: 2.9 X10(6)UL (ref 3.8–5.3)
SODIUM SERPL-SCNC: 143 MMOL/L (ref 136–145)
WBC # BLD AUTO: 8.4 X10(3) UL (ref 4–11)

## 2025-06-27 PROCEDURE — 97535 SELF CARE MNGMENT TRAINING: CPT

## 2025-06-27 PROCEDURE — 80048 BASIC METABOLIC PNL TOTAL CA: CPT | Performed by: ORTHOPAEDIC SURGERY

## 2025-06-27 PROCEDURE — 85025 COMPLETE CBC W/AUTO DIFF WBC: CPT | Performed by: HOSPITALIST

## 2025-06-27 PROCEDURE — 97530 THERAPEUTIC ACTIVITIES: CPT

## 2025-06-27 PROCEDURE — 97161 PT EVAL LOW COMPLEX 20 MIN: CPT

## 2025-06-27 PROCEDURE — 97166 OT EVAL MOD COMPLEX 45 MIN: CPT

## 2025-06-27 PROCEDURE — 83735 ASSAY OF MAGNESIUM: CPT | Performed by: HOSPITALIST

## 2025-06-27 PROCEDURE — 82962 GLUCOSE BLOOD TEST: CPT

## 2025-06-27 RX ORDER — MAGNESIUM OXIDE 400 MG/1
400 TABLET ORAL ONCE
Status: COMPLETED | OUTPATIENT
Start: 2025-06-27 | End: 2025-06-27

## 2025-06-27 RX ORDER — OXYCODONE HYDROCHLORIDE 5 MG/1
5 TABLET ORAL EVERY 6 HOURS PRN
Qty: 40 TABLET | Refills: 0 | Status: SHIPPED | OUTPATIENT
Start: 2025-06-27

## 2025-06-27 RX ORDER — SODIUM CHLORIDE 9 MG/ML
INJECTION, SOLUTION INTRAVENOUS CONTINUOUS
Status: DISCONTINUED | OUTPATIENT
Start: 2025-06-27 | End: 2025-06-28

## 2025-06-27 RX ORDER — METOPROLOL TARTRATE 25 MG/1
25 TABLET, FILM COATED ORAL
Status: DISCONTINUED | OUTPATIENT
Start: 2025-06-27 | End: 2025-07-01

## 2025-06-27 NOTE — OCCUPATIONAL THERAPY NOTE
OCCUPATIONAL THERAPY EVALUATION - INPATIENT     Room Number: 386/386-A  Evaluation Date: 6/27/2025  Type of Evaluation: Initial  Presenting Problem: Falls, displaced oblique supracondylar periprosthetic distal femur fracture.    Physician Order: IP Consult to Occupational Therapy  Reason for Therapy: ADL/IADL Dysfunction and Discharge Planning    Precautions: Bed/chair alarm (LLE knee immobilzer (no orders but on pt on day of eval))  Fall Risk: High fall risk  L Lower Extremity: Non-Weight Bearing      OCCUPATIONAL THERAPY ASSESSMENT   Patient is currently functioning below baseline with self-care and functional mobility.   Prior to admission, patient's baseline is mod I w/ use of cane or walkerr.    Patient is requiring maximum assistance as a result of the following impairments: decreased functional strength, decreased functional reach, pain, impaired   balance, impaired coordination, impaired motor planning, decreased muscular endurance, difficulty maintaining precautions, medical status, limited   ROM, decreased insight to deficits, and decreased safety awareness.   Occupational Therapy will continue to follow for duration of hospitalization.    Patient will benefit from continued skilled OT Services to promote return to prior level of function and safety with continuous assistance and gradual rehabilitative therapy      History Related to Current Admission: Patient is a 79 year old female admitted on 6/25/2025 with Presenting Problem: Falls, displaced oblique supracondylar periprosthetic distal femur fracture.. Co-Morbidities : DM2, emphysema, GERD, HTN, polio    EVALUATION SESSION  FUNCTIONAL TRANSFER ASSESSMENT  Sit to Stand: Edge of Bed; Chair  Edge of Bed: Dependent  Chair: Dependent    BED MOBILITY  Rolling: Maximum Assist  Supine to Sit : Dependent  Sit to Supine (OT): Dependent    BALANCE ASSESSMENT  Static Sitting: Stand-by Assist    FUNCTIONAL ADL ASSESSMENT  Eating: Supervision  Grooming Seated:  Supervision  LB Dressing Seated: Dependent  Toileting Seated: Dependent    ACTIVITY TOLERANCE:   Patient tolerated EOB sitting x 10 minutes w/ c/o pain LLE   BP: 93/38    O2 SATURATIONS   94% RA    COGNITION  Overall WFL  Needs cues to redirect d/t easily distracted  Pt is able to recall NWB precautions to LLE      Upper extremity  BUE AROM and Strength WFL     EDUCATION PROVIDED  Patient Education : Role of Occupational Therapy; Discharge Recommendations; DME Recommendations; Plan of Care; Functional Transfer Techniques; Fall Prevention; Weight Bear Status; Posture/Positioning; Edema Reduction; Energy Conservation; Proper Body Mechanics  Patient's Response to Education: Verbalized Understanding; Requires Further Education      Equipment used: hospital bed functions, drop arm recliner chair    Therapist comments: OT educated patient on safety,  sequencing, energy conservation, pain management, home modifications and adaptive equipment to increase independence with ADLs.    Patient able to recall NWB and able to adhere during mobility.  Patient screams with any touch or repositioning of LLE  Patient instructed in lateral scooting from EOB to chair, going to the right; completed w/ mod A  Nursing later attempted lateral scoot from chair to bed, however patient unable to tolerate d/t c/o severe pain and tamia lift was required.      Patient End of Session: Up in chair, Needs met, Call light within reach, RN aware of session/findings, All patient questions and concerns addressed, Alarm set, Discussed recommendations with /, With  staff, Hospital anti-slip socks, Family present    OCCUPATIONAL PROFILE    HOME SITUATION  Type of Home: House  Home Layout: Multi-level  Lives With: Alone    Toilet and Equipment: Comfort height toilet  Shower/Tub and Equipment: Walk-in shower, Tub-shower combo  Other Equipment:  (RW)    Occupation/Status: retired  Hand Dominance: Right  Drives: No  Patient Regularly  Uses: Reading glasses    Prior Level of Function: mod I with ADLs and functional mobility with use of cane or walker.  Patient reports no other falls other than the fall that brought her in this admit.  Patient reports she orders her groceries for delivery and does own cooking, cleaning, laundry.      SUBJECTIVE   PAIN ASSESSMENT  Rating: Unable to rate  Location: LLE w/ any repositioning  Management Techniques: Nurse notified, Relaxation, Repositioning, Activity promotion (nurse provided pain meds prior to session)    OBJECTIVE  Precautions: Bed/chair alarm (LLE knee immobilzer (no orders but on pt on day of eval))  Fall Risk: High fall risk    WEIGHT BEARING RESTRICTION  L Lower Extremity: Non-Weight Bearing      ASSESSMENTS    AM-PAC '6-Clicks' Inpatient Daily Activity Short Form  -   Putting on and taking off regular lower body clothing?: Total  -   Bathing (including washing, rinsing, drying)?: Total  -   Toileting, which includes using toilet, bedpan or urinal? : Total  -   Putting on and taking off regular upper body clothing?: A Little  -   Taking care of personal grooming such as brushing teeth?: A Little  -   Eating meals?: A Little    AM-PAC Score:  Score: 12  Approx Degree of Impairment: 66.57%  Standardized Score (AM-PAC Scale): 30.6    PLAN  OT Device Recommendations: TBD  OT Treatment Plan: Balance activities, Energy conservation/work simplification techniques, ADL training, Functional transfer training, UE strengthening/ROM, Endurance training, Patient/Family education, Patient/Family training, Cognitive reorientation, Equipment eval/education, Compensatory technique education  Rehab Potential : Good  Frequency: 3-5x/week  Number of Visits to Meet Established Goals: 7    ADL Goals  Patient will perform toileting with mod A and AE PRN  Patient will perform LB dressing with mod A and AE PRN    Functional Transfer Goals  Patient will perform bed mobility supine to sit with mod A  Patient will perform  bed mobility sit to supine with mod A  Patient will perform commode transfer with mod A while adhering to NWB      Patient Evaluation Complexity Level:   Occupational Profile/Medical History MODERATE - Expanded review of history including review of medical or therapy record   Specific performance deficits impacting engagement in ADL/IADL MODERATE  3 - 5 performance deficits   Client Assessment/Performance Deficits MODERATE - Comorbidities and min to mod modifications of tasks    Clinical Decision Making MODERATE - Analysis of occupational profile, detailed assessments, several treatment options    Overall Complexity MODERATE     OT Session Time: 45 minutes  Self-Care Home Management: 15 minutes  Therapeutic Activities: 15 minutes

## 2025-06-27 NOTE — PROGRESS NOTES
[facility]   part of [facility]        Miriam Contreras Patient Status:  Observation    1945 MRN [id number]   Location [facility] 3SW-A Attending Andrew Stinson MD   Hosp Day # 0 PCP Juanpablo Bowles MD       Subjective:    POD #1 s/p ORIF left distal periprosthetic femur fracture  Up to chair  No major complaints.  No calf pain, CP, SOB, lightheadedness, nausea.      Physical Exam:  Temp:  [98 °F (36.7 °C)-98.8 °F (37.1 °C)] 98 °F (36.7 °C)  Pulse:  [] 85  Resp:  [14-27] 18  BP: ()/() 92/71  SpO2:  [90 %-98 %] 90 %    In no acute distress  Knee immobilizer in place  Aquacel dressings clean and dry  No signs of infection  Thigh and leg are soft.  Both calves are NT.  No signs of DVT.  NVI +PF/DF +PP    Data review:  Post op x-ray reviewed.    Recent Labs   Lab 25  1323 25  0506 25  0441   RBC 4.09 3.23* 2.90*   HGB 13.3 10.5* 9.5*   HCT 40.2 31.8* 29.5*   MCV 98.3 98.5 101.7*   MCH 32.5 32.5 32.8   MCHC 33.1 33.0 32.2   RDW 16.7 16.6 16.4   NEPRELIM 9.97* 5.97 6.74   WBC 12.5* 7.8 8.4   .0 212.0 192.0         Recent Labs   Lab 25  1323 25  0506 25  0441   * 135* 142*   BUN 22 24* 23   CREATSERUM 0.81 0.82 0.79   EGFRCR 73 72 76   CA 9.3 8.8 8.6*   ALB 4.6  --   --     144 143   K 3.9 3.9 3.9    110 109   CO2 23.0 25.0 23.0   ALKPHO 91  --   --    AST 23  --   --    ALT 20  --   --    BILT 0.7  --   --    TP 7.3  --   --        S/p ORIF left distal periprosthetic femur fracture    Today's plan discussed.  PT/OT  NWB LLE  Immobilizer on when up and moving.  DVT prophylaxis with  Eliquis 2.5mg BID x 6 weeks  Anticipate DC to ANA.  Oxycodone 5mg prescription printed and on physical chart for transfer.  Follow up with Dr. Del Castillo as outpatient in 2 weeks      Ana Wayne PA-C

## 2025-06-27 NOTE — CM/SW NOTE
06/27/25 1402   Choice of Post-Acute Provider   Informed patient of right to choose their preferred provider Yes   List of appropriate post-acute services provided to patient/family with quality data Yes   Information given to Patient;Son       Met with pt at bedside and provided list of accepting rehab facilities. Spoke with pt's son Jessica and emailed list to him via AIDIN. Pt/family to review options to determine preferred facility. Discussed need for insurance auth. Also informed pt's son that insurance is not anticipated to cover ANA stay for entire duration of pt's NWB status. Once pt reaches a plateau in her progress with therapy services, insurance coverage will end. At that time pt/family will need to determine if pt can return to home with caregivers vs group home vs continued NH stay as private pay. Mayra from [facility] is following and able to assist pt/family with this transition.     Await pt/family's decision on preferred facility for ANA. / to remain available for support and/or discharge planning.     Monica Bullock, [facility]  Discharge Planner  [phone]    Addendum: Received call from pt's son, Yehuad [phone]. Reviewed DC planning for ANA in detail. All questions/concerns addressed. He will tour facilities this weekend and contact SW with preferred facility. Will plan to have chosen facility initiate request for insurance auth on Monday.      Double Island Pedicle Flap Text: The defect edges were debeveled with a #15 scalpel blade.  Given the location of the defect, shape of the defect and the proximity to free margins a double island pedicle advancement flap was deemed most appropriate.  Using a sterile surgical marker, an appropriate advancement flap was drawn incorporating the defect, outlining the appropriate donor tissue and placing the expected incisions within the relaxed skin tension lines where possible.    The area thus outlined was incised deep to adipose tissue with a #15 scalpel blade.  The skin margins were undermined to an appropriate distance in all directions around the primary defect and laterally outward around the island pedicle utilizing iris scissors.  There was minimal undermining beneath the pedicle flap.

## 2025-06-27 NOTE — PLAN OF CARE
Pt A&Ox4. VSS on 2L O2 NC. Telemetry monitoring. Aquacel dressing to left hip intact and dry. Immobilizer in place, NWB status. PT rec rehab, pending ins auth. Call light within reach. Will continue to monitor.

## 2025-06-27 NOTE — CM/SW NOTE
Patient is s/p femur ORIF. Therapy evals pending. Noted pt NWB LLE with KI. Anticipate need for ANA at discharge. Referrals sent to facilities via AIDIN. PASRR completed and added to referral. Message sent to [facility] for review. Noted pt is in observation status. Message sent to  to review to determine if pt meets criteria for inpatient status. Insurance auth will be needed for ANA. Await [facility]IN responses and therapy evals for further DC planning. / to remain available for support and/or discharge planning.     Monica Bullock, [facility]  Discharge Planner  [phone]    Addendum: Met with pt at bedside to discuss DC planning as above. Pt agreeable with plan for ANA at DC. She stated she lives alone in a split level home and will need to be NWB for 3 months. She anticipates having to private pay to remain in NH after insurance coverage for ANA ends. She confirmed she does have sufficient funds to cover private pay costs. She has no preference in rehab facility. Plan to follow up with pt later today to provide rehab options. Insurance auth will be needed. Therapy evals pending.

## 2025-06-27 NOTE — PLAN OF CARE
Alert and oriented x 4, forgetful at times. VSS; on 3L NC O2. Patient stated no need for pain meds, resting comfortably. Surgical dressing clean, dry and intact. Voiding via purewick. Plan for PT/OT POC discussed with patient. Safety precautions in place. Call light within reach.     This RN paged on-call hosp Dr. Allan @[phone] if okay to resume transfer hold meds. Stated ok.     This RN verified with Fabrice Stroud MD. If resuming heparin and aspirin. MD stated to hold heparin and aspirin for tonight, operating surgeon Dr. Del Castillo to decide today when to resume.

## 2025-06-27 NOTE — PROGRESS NOTES
[facility] Cardiology Progress Note        Miriam Contreras Patient Status:  Observation    1945 MRN [id number]   Location [facility] 3SW-A Attending Andrew Stinson MD   Hosp Day # 0 PCP Juanpablo Bowles MD     Subjective:  The patient denies  chest pain and shortness of breath.    Medications:  Scheduled Medications[1]    Continuous Infusions:  Medication Infusions[2]      Allergies:  Allergies[3]      Objective:        Intake/Output:      Intake/Output Summary (Last 24 hours) at 2025 0754  Last data filed at 2025 0400  Gross per 24 hour   Intake 2200 ml   Output 1175 ml   Net 1025 ml     Wt Readings from Last 3 Encounters:   25 150 lb (68 kg)   22 173 lb (78.5 kg)   21 173 lb (78.5 kg)       Physical Exam:        Vitals:    25 2120 25 2155 25 2302 25 0400   BP: 119/46  114/47 92/71   BP Location: Left arm  Left arm Left arm   Pulse: 113  83 85   Resp: 18  18 18   Temp: 98.5 °F (36.9 °C)  98.1 °F (36.7 °C) 98 °F (36.7 °C)   TempSrc: Oral  Oral Oral   SpO2: 92% 93% 96% 90%   Weight:       Height:           Temp:  [98 °F (36.7 °C)-98.8 °F (37.1 °C)] 98 °F (36.7 °C)  Pulse:  [] 85  Resp:  [14-] 18  BP: ()/() 92/71  SpO2:  [90 %-98 %] 90 %      Temp: 98 °F (36.7 °C)  Pulse: 85  Resp: 18  BP: 92/71  General:  Appears comfortable  HEENT: No focal deficits.  Neck: No JVD, carotids 2+ no bruits.  Cardiac: Regular S1S2.  No S3, S4, rub, click.  No murmur.  Lungs: Clear to auscultation and percussion.  Abdomen: Soft, non-tender.   Extremities: No LE edema.  No clubbing or cyanosis.    Neurologic: Alert and oriented, normal affect.  Skin: Warm and dry.           LABS:      HEM:  Recent Labs   Lab 25  1323 25  0506 25  0441   WBC 12.5* 7.8 8.4   HGB 13.3 10.5* 9.5*   HCT 40.2 31.8* 29.5*   .0 212.0 192.0       Chem:  Recent Labs   Lab 25  1323 25  0506 25  0441    144 143   K  3.9 3.9 3.9    110 109   CO2 23.0 25.0 23.0   BUN 22 24* 23   CREATSERUM 0.81 0.82 0.79   CA 9.3 8.8 8.6*   MG  --  1.8 1.8   * 135* 142*       Recent Labs   Lab 06/25/25  1323   ALT 20   AST 23   ALB 4.6       No results for input(s): \"PT\", \"PTT\", \"INR\" in the last 168 hours.        Lab Results   Component Value Date    TROP <0.017 09/14/2018         Invalid input(s): \"PBNPML\"                       Diagnostics:   Telemetry: SR    EKG, 6/27/2025,         Echo:      Cardiac Cath:              Impression:        Fall--->Left femur fx-->Open reduction internal fixation left distal periprosthetic femur fracture , 6/26/25   2. CAD, s/p 2 vessel CABG (RCA, LAD), 2018. Asymptomatic.. on asa, statin  3. Hypertension .   4. Diabetes  5. Dyslipidemia     Plan:    1.  Monitor cardiac rhythm through manisha-op period  2. Will reduce  metoprolol to 25 mg bid  3. Continue aspirin          Matthew Broussard MD  6/27/2025  7:54 AM           [1]    metoprolol tartrate  25 mg Oral TID Beta Blocker/Cardiac    sennosides  17.2 mg Oral Nightly    docusate sodium  100 mg Oral BID    multivitamin  1 tablet Oral Daily    apixaban  2.5 mg Oral BID@0600,1800    acetaminophen  1,000 mg Oral Q8H TERRY    ceFAZolin  2 g Intravenous Q8H    [Transfer Hold] aspirin  81 mg Oral Daily    lisinopril  10 mg Oral Daily    atorvastatin  20 mg Oral Nightly    insulin aspart  1-5 Units Subcutaneous TID AC and HS   [2] [3]   Allergies  Allergen Reactions    Amlodipine SHORTNESS OF BREATH     Sweating and hair loss as well    Furadantin [Nitrofurantoin] NAUSEA ONLY     ITCHING    Pcn [Penicillins Cross Reactors] RASH and NAUSEA ONLY     ITCHING    Zyban [Bupropion Hcl] RASH

## 2025-06-27 NOTE — PHYSICAL THERAPY NOTE
PHYSICAL THERAPY EVALUATION - INPATIENT     Room Number: 386/386-A  Evaluation Date: 6/27/2025  Type of Evaluation: Initial  Physician Order: PT Eval and Treat    Presenting Problem: Fall, L femoral neck fracture s/p ORIF 6/26  Co-Morbidities : DM2, emphysema, GERD, HTN, polio  Reason for Therapy: Mobility Dysfunction and Discharge Planning    PHYSICAL THERAPY ASSESSMENT   Patient is a 79 year old female admitted 6/25/2025 for fall, L femoral neck fracture s/p ORIF 6/26.  Prior to admission, patient's baseline is independent.  Patient is currently functioning below baseline with bed mobility, transfers, and gait.  Patient is requiring maximum assist and dependent as a result of the following impairments: decreased functional strength, decreased endurance/aerobic capacity, pain, impaired static and dynamic balance, impaired motor planning, decreased muscular endurance, and medical status.  Physical Therapy will continue to follow for duration of hospitalization.    Patient will benefit from continued skilled PT Services to promote return to prior level of function and safety with continuous assistance and gradual rehabilitative therapy .    PLAN DURING HOSPITALIZATION  Nursing Mobility Recommendation : Lift Equipment     PT Treatment Plan: Bed mobility, Body mechanics, Endurance, Energy conservation, Patient education, Family education, Gait training, Transfer training, Balance training, Strengthening  Rehab Potential : Guarded  Frequency (Obs): 3x/week     CURRENT GOALS    Goal #1 Patient is able to demonstrate supine - sit EOB @ level: moderate assistance     Goal #2 Patient is able to demonstrate transfers EOB to/from Chair/Wheelchair at assistance level: minimum assistance     Goal #3      Goal #4    Goal #5    Goal #6    Goal Comments: Goals established on 6/27/2025      PHYSICAL THERAPY MEDICAL/SOCIAL HISTORY  History related to current admission: Patient is a 79 year old female admitted on 6/25/2025 from home  for fall.  Pt diagnosed with L femoral neck fracture s/p ORIF 6/26.    HOME SITUATION  Type of Home: House  Home Layout: Multi-level                     Lives With: Alone    Drives: No          Prior Level of [place]: Pt typically indep with ADLs and mobility. Reports that she has not left her house since March. Receives instacart delivery.     SUBJECTIVE  Pt screaming during movement.     OBJECTIVE  Precautions: Bed/chair alarm  Fall Risk: High fall risk    WEIGHT BEARING RESTRICTION  L Lower Extremity: Non-Weight Bearing    PAIN ASSESSMENT  Rating: Unable to rate  Location: LLE  Management Techniques: Relaxation    COGNITION  Overall Cognitive Status:  WFL - within functional limits    RANGE OF MOTION AND STRENGTH ASSESSMENT  See OT note for UE assessment    Lower extremity ROM is within functional limits except for the following:  LLE s/p fx and ORIF    Lower extremity strength is within functional limits except for the following:   LLE s/p fx and ORIF    BALANCE  Static Sitting: Poor +  Dynamic Sitting: Poor +  Static Standing: Not tested  Dynamic Standing: Not tested    ADDITIONAL TESTS                                    ACTIVITY TOLERANCE                         O2 WALK       NEUROLOGICAL FINDINGS                        AM-PAC '6-Clicks' INPATIENT SHORT FORM - BASIC MOBILITY  How much difficulty does the patient currently have...  Patient Difficulty: Turning over in bed (including adjusting bedclothes, sheets and blankets)?: A Lot   Patient Difficulty: Sitting down on and standing up from a chair with arms (e.g., wheelchair, bedside commode, etc.): Unable   Patient Difficulty: Moving from lying on back to sitting on the side of the bed?: A Lot   How much help from another person does the patient currently need...   Help from Another: Moving to and from a bed to a chair (including a wheelchair)?: A Lot   Help from Another: Need to walk in hospital room?: Total   Help from Another: Climbing 3-5 steps with a  railing?: Total     AM-PAC Score:  Raw Score: 9   Approx Degree of Impairment: 81.38%   Standardized Score (AM-PAC Scale): 30.55   CMS Modifier (G-Code): CM    FUNCTIONAL ABILITY STATUS  Gait Assessment   Functional Mobility/Gait Assessment  Gait Assistance: Not tested    Skilled Therapy Provided     Bed Mobility:  Rolling: NT  Supine to sit: maxA x2   Sit to supine: NT     Transfer Mobility:  Lateral scooting: CGA 50% of the way, maxA second half        Therapist's Comments: RN cleared for session. Pt agreeable for therapy, received supine. Pt with increased pain and screaming during mobility. Improves with rest and relaxation. Cued on BUE/RLE placement during scooting. Instructed to call for nursing staff for any needs and OOB mobility.     Exercise/Education Provided:  Bed mobility  Body mechanics  Energy conservation  Functional activity tolerated  Gait training  Posture  Strengthening  Transfer training    Patient End of Session: Up in chair, Needs met, Call light within reach, RN aware of session/findings, All patient questions and concerns addressed, Hospital anti-slip socks, Alarm set, Discussed recommendations with /      Patient Evaluation Complexity Level:  History High - 3 or more personal factors and/or co-morbidities   Examination of body systems Low -  addressing 1-2 elements   Clinical Presentation Low- Stable   Clinical Decision Making Low Complexity       PT Session Time: 25 minutes   Therapeutic Activity: 15 minutes

## 2025-06-27 NOTE — PROGRESS NOTES
[facility] Hospitalist Progress Note                                                                     [facility]   part of [facility]      Miriam Contreras  8/31/1945    SUBJECTIVE: patient denies any chest pain, palpitations shortness of breath, cough, nausea or vomiting.     OBJECTIVE:  Temp:  [98 °F (36.7 °C)-98.8 °F (37.1 °C)] 98 °F (36.7 °C)  Pulse:  [] 85  Resp:  [14-27] 18  BP: ()/() 92/71  SpO2:  [90 %-98 %] 90 %  Exam  Gen: No acute distress, alert and oriented  Pulm: Lungs clear bilaterally, normal respiratory effort, no crackles, no wheezing  CV: Heart with regular rate and rhythm, no murmur.   Abd: Abdomen soft, nontender, nondistended, bowel sounds present  MSK: no significant pitting edema or tenderness of the LE  Skin: no new rashes or lesions    Labs:   Recent Labs   Lab 06/25/25  1323 06/26/25  0506 06/27/25  0441   WBC 12.5* 7.8 8.4   HGB 13.3 10.5* 9.5*   MCV 98.3 98.5 101.7*   .0 212.0 192.0       Recent Labs   Lab 06/25/25  1323 06/26/25  0506 06/27/25  0441    144 143   K 3.9 3.9 3.9    110 109   CO2 23.0 25.0 23.0   BUN 22 24* 23   CREATSERUM 0.81 0.82 0.79   CA 9.3 8.8 8.6*   MG  --  1.8 1.8   * 135* 142*       Recent Labs   Lab 06/25/25  1323   ALT 20   AST 23   ALB 4.6       Recent Labs   Lab 06/26/25  0522 06/26/25  1208 06/26/25  1848 06/26/25  2125 06/27/25  0520   PGLU 135* 121* 152* 162* 143*       Meds:   Scheduled: Scheduled Medications[1]  Continuous Infusions: Medication Infusions[2]  PRN: PRN Medications[3]    ASSESSMENT / PLAN:   79 year old female with history of coronary disease with bypass, type 2 diabetes, emphysema, GERD, hypertension, hyperlipidemia, osteoarthritis, history of polio presenting with left lower extremity pain.     Left Femoral fracture  -ortho consulted--> POD # 1 s/p open reduction internal fixation left distal periprosthetic femur fracture.   -prn pain  meds  -PT/OT     Fall  -mechanical  -no further eval needed at this time     CAD hx  -asa  -atorvastatin      HTN  -sbp stable  -lisinopril--> hold for low BP     HLD  -atorvastatin      Type 2 DM  -hold home meds  -low dose insulin sliding scale    Hypoxia  -likely secondary to atelectasis  -encourage IS  -titrate o2 as tolerated     Quality:  DVT Prophylaxis: scd, heparin sq  CODE status: Full   Banda: none     Plan of care discussed with patient and staff     Dispo: no discharge     Andrew Stinson MD  [facility] Hospitalist  [phone]           [1]    metoprolol tartrate  25 mg Oral TID Beta Blocker/Cardiac    sennosides  17.2 mg Oral Nightly    docusate sodium  100 mg Oral BID    multivitamin  1 tablet Oral Daily    apixaban  2.5 mg Oral BID@0600,1800    acetaminophen  1,000 mg Oral Q8H TERRY    ceFAZolin  2 g Intravenous Q8H    [Transfer Hold] aspirin  81 mg Oral Daily    lisinopril  10 mg Oral Daily    atorvastatin  20 mg Oral Nightly    insulin aspart  1-5 Units Subcutaneous TID AC and HS   [2] [3]   polyethylene glycol (PEG 3350)    magnesium hydroxide    bisacodyl    fleet enema    ondansetron    metoclopramide    oxyCODONE **OR** oxyCODONE    HYDROmorphone **OR** HYDROmorphone    glucose **OR** glucose **OR** glucose-vitamin C **OR** dextrose **OR** glucose **OR** glucose **OR** glucose-vitamin C    [Transfer Hold] acetaminophen    [Transfer Hold] morphINE **OR** [Transfer Hold] morphINE **OR** [Transfer Hold] morphINE    [Transfer Hold] ondansetron    [Transfer Hold] metoclopramide    acetaminophen **OR** HYDROcodone-acetaminophen **OR** HYDROcodone-acetaminophen

## 2025-06-28 LAB
ANION GAP SERPL CALC-SCNC: 10 MMOL/L (ref 0–18)
BASOPHILS # BLD AUTO: 0.04 X10(3) UL (ref 0–0.2)
BASOPHILS NFR BLD AUTO: 0.5 %
BUN BLD-MCNC: 29 MG/DL (ref 9–23)
CALCIUM BLD-MCNC: 8.2 MG/DL (ref 8.7–10.6)
CHLORIDE SERPL-SCNC: 109 MMOL/L (ref 98–112)
CO2 SERPL-SCNC: 23 MMOL/L (ref 21–32)
CREAT BLD-MCNC: 0.64 MG/DL (ref 0.55–1.02)
EGFRCR SERPLBLD CKD-EPI 2021: 89 ML/MIN/1.73M2 (ref 60–?)
EOSINOPHIL # BLD AUTO: 0.05 X10(3) UL (ref 0–0.7)
EOSINOPHIL NFR BLD AUTO: 0.6 %
ERYTHROCYTE [DISTWIDTH] IN BLOOD BY AUTOMATED COUNT: 16 %
GLUCOSE BLD-MCNC: 109 MG/DL (ref 70–99)
GLUCOSE BLD-MCNC: 164 MG/DL (ref 70–99)
GLUCOSE BLD-MCNC: 169 MG/DL (ref 70–99)
GLUCOSE BLD-MCNC: 176 MG/DL (ref 70–99)
GLUCOSE BLD-MCNC: 185 MG/DL (ref 70–99)
HCT VFR BLD AUTO: 24.9 % (ref 35–48)
HGB BLD-MCNC: 8.3 G/DL (ref 12–16)
IMM GRANULOCYTES # BLD AUTO: 0.05 X10(3) UL (ref 0–1)
IMM GRANULOCYTES NFR BLD: 0.6 %
LYMPHOCYTES # BLD AUTO: 1.13 X10(3) UL (ref 1–4)
LYMPHOCYTES NFR BLD AUTO: 13.6 %
MAGNESIUM SERPL-MCNC: 1.8 MG/DL (ref 1.6–2.6)
MCH RBC QN AUTO: 32.9 PG (ref 26–34)
MCHC RBC AUTO-ENTMCNC: 33.3 G/DL (ref 31–37)
MCV RBC AUTO: 98.8 FL (ref 80–100)
MONOCYTES # BLD AUTO: 0.85 X10(3) UL (ref 0.1–1)
MONOCYTES NFR BLD AUTO: 10.3 %
NEUTROPHILS # BLD AUTO: 6.16 X10 (3) UL (ref 1.5–7.7)
NEUTROPHILS # BLD AUTO: 6.16 X10(3) UL (ref 1.5–7.7)
NEUTROPHILS NFR BLD AUTO: 74.4 %
OSMOLALITY SERPL CALC.SUM OF ELEC: 303 MOSM/KG (ref 275–295)
PLATELET # BLD AUTO: 172 10(3)UL (ref 150–450)
POTASSIUM SERPL-SCNC: 3.8 MMOL/L (ref 3.5–5.1)
RBC # BLD AUTO: 2.52 X10(6)UL (ref 3.8–5.3)
SODIUM SERPL-SCNC: 142 MMOL/L (ref 136–145)
WBC # BLD AUTO: 8.3 X10(3) UL (ref 4–11)

## 2025-06-28 PROCEDURE — 80048 BASIC METABOLIC PNL TOTAL CA: CPT | Performed by: ORTHOPAEDIC SURGERY

## 2025-06-28 PROCEDURE — 82962 GLUCOSE BLOOD TEST: CPT

## 2025-06-28 PROCEDURE — 85025 COMPLETE CBC W/AUTO DIFF WBC: CPT | Performed by: HOSPITALIST

## 2025-06-28 PROCEDURE — 83735 ASSAY OF MAGNESIUM: CPT | Performed by: HOSPITALIST

## 2025-06-28 PROCEDURE — 51701 INSERT BLADDER CATHETER: CPT

## 2025-06-28 PROCEDURE — 0T9B70Z DRAINAGE OF BLADDER WITH DRAINAGE DEVICE, VIA NATURAL OR ARTIFICIAL OPENING: ICD-10-PCS | Performed by: UROLOGY

## 2025-06-28 RX ORDER — MAGNESIUM OXIDE 400 MG/1
400 TABLET ORAL ONCE
Status: COMPLETED | OUTPATIENT
Start: 2025-06-28 | End: 2025-06-28

## 2025-06-28 RX ORDER — POTASSIUM CHLORIDE 1500 MG/1
40 TABLET, EXTENDED RELEASE ORAL ONCE
Status: COMPLETED | OUTPATIENT
Start: 2025-06-28 | End: 2025-06-28

## 2025-06-28 NOTE — PLAN OF CARE
Aox4. VSS. On 2LO2. Telemetry monitoring. Tolerating diet. Pain controlled with scheduled meds. Dressing to Left Hip: aquacell C/D/I. Immobilizer in place NWB. Pt recommends rehab, pending insurance auth. POC reviewed with pt and safety measures in place.

## 2025-06-28 NOTE — PROGRESS NOTES
[facility] Hospitalist Progress Note                                                                     [facility]   part of [facility]      Miriam Contreras  8/31/1945    SUBJECTIVE: pain controlled if not moving. Feels constipated, not passing flatus.     OBJECTIVE:  Temp:  [98 °F (36.7 °C)-98.5 °F (36.9 °C)] 98 °F (36.7 °C)  Pulse:  [78-86] 86  Resp:  [18] 18  BP: ()/(33-50) 133/50  SpO2:  [90 %-98 %] 94 %  Exam  Gen: No acute distress, alert and oriented  Pulm: Lungs clear bilaterally, normal respiratory effort, no crackles, no wheezing  CV: Heart with regular rate and rhythm, no murmur.   Abd: Abdomen mildly distended, nontender  MSK: no significant pitting edema or tenderness of the LE  Skin: no new rashes or lesions    Labs:   Recent Labs   Lab 06/25/25  1323 06/26/25  0506 06/27/25  0441 06/28/25  0514   WBC 12.5* 7.8 8.4 8.3   HGB 13.3 10.5* 9.5* 8.3*   MCV 98.3 98.5 101.7* 98.8   .0 212.0 192.0 172.0       Recent Labs   Lab 06/25/25  1323 06/26/25  0506 06/27/25  0441 06/28/25  0514    144 143 142   K 3.9 3.9 3.9 3.8    110 109 109   CO2 23.0 25.0 23.0 23.0   BUN 22 24* 23 29*   CREATSERUM 0.81 0.82 0.79 0.64   CA 9.3 8.8 8.6* 8.2*   MG  --  1.8 1.8 1.8   * 135* 142* 164*       Recent Labs   Lab 06/25/25  1323   ALT 20   AST 23   ALB 4.6       Recent Labs   Lab 06/27/25  0520 06/27/25  1230 06/27/25  1605 06/27/25  2121 06/28/25  0517   PGLU 143* 210* 152* 182* 176*       Meds:   Scheduled: Scheduled Medications[1]  Continuous Infusions: Medication Infusions[2]  PRN: PRN Medications[3]    ASSESSMENT / PLAN:   79 year old female with history of coronary disease with bypass, type 2 diabetes, emphysema, GERD, hypertension, hyperlipidemia, osteoarthritis, history of polio presenting with left lower extremity pain.     Left Femoral fracture  -ortho consulted--> s/p open reduction internal fixation left distal periprosthetic femur  fracture 6/26  -prn pain meds, bowel regimen, IS. Monitor for urine retention  -PT/OT--> possible ANA     Fall  -mechanical  -no further eval needed at this time     CAD hx  -asa  -atorvastatin   -BB, cards following     HTN  -sbp stable  -lisinopril--> hold for low BP  -on metop 25 bid at reduced dose     HLD  -atorvastatin      Type 2 DM  -hold home meds  -low dose insulin sliding scale    Hypoxia  -likely secondary to atelectasis  -encourage IS  -titrate o2 as tolerated--> now on RA sometimes, other times needing 2L     Quality:  DVT Prophylaxis: scd, heparin sq  CODE status: Full   Banda: none     Plan of care discussed with patient and staff     Dispo: no discharge     Chayito Yo MD  [facility] Hospitalist  [phone]           [1]    metoprolol tartrate  25 mg Oral 2x Daily(Beta Blocker)    sennosides  17.2 mg Oral Nightly    docusate sodium  100 mg Oral BID    multivitamin  1 tablet Oral Daily    apixaban  2.5 mg Oral BID@0600,1800    acetaminophen  1,000 mg Oral Q8H TERRY    [Transfer Hold] aspirin  81 mg Oral Daily    lisinopril  10 mg Oral Daily    atorvastatin  20 mg Oral Nightly    insulin aspart  1-5 Units Subcutaneous TID AC and HS   [2]    sodium chloride 83 mL/hr at 06/28/25 0531   [3]   polyethylene glycol (PEG 3350)    magnesium hydroxide    bisacodyl    fleet enema    ondansetron    metoclopramide    oxyCODONE **OR** oxyCODONE    HYDROmorphone **OR** HYDROmorphone    glucose **OR** glucose **OR** glucose-vitamin C **OR** dextrose **OR** glucose **OR** glucose **OR** glucose-vitamin C    [Transfer Hold] acetaminophen    [Transfer Hold] morphINE **OR** [Transfer Hold] morphINE **OR** [Transfer Hold] morphINE    [Transfer Hold] ondansetron    [Transfer Hold] metoclopramide    acetaminophen **OR** HYDROcodone-acetaminophen **OR** HYDROcodone-acetaminophen

## 2025-06-28 NOTE — CONSULTS
[facility] Urology Consultation    Miriam Contreras Patient Status:  Inpatient    1945 MRN [id number]   Location [facility] 3SW-A Attending Chayito Yo MD   Hosp Day # 1 PCP Juanpablo Bowles MD     Reason for Consultation:  Incomplete bladder emptying, inability to place silverman    History of Present Illness:  Miriam Contreras is a a(n) 79 year old female with minimal baseline voiding complaints admitted after a fall and is s/p ORIF L periprosthetic femur fracture. POD #2. She has been voiding but her PVRs have been elevated. Multiple attempts were made to straight cath and place a silverman by the RN staff unsuccessfully and Urology was consulted. She was constipated but had a large BM this am after a suppository. No constipation prior to admission.    History:  Past Medical History[1]  Past Surgical History[2]  Family History[3]   reports that she quit smoking about 18 years ago. Her smoking use included cigarettes. She started smoking about 63 years ago. She has a 67.5 pack-year smoking history. She has never used smokeless tobacco. She reports that she does not currently use alcohol. She reports that she does not use drugs.    Allergies:  Allergies[4]    Medications:  Current Hospital Medications[5]    Review of Systems:  Pertinent items are noted in HPI.    Physical Exam:  Temp (24hrs), Av.2 °F (36.8 °C), Min:98 °F (36.7 °C), Max:98.5 °F (36.9 °C)    /59 (BP Location: Left arm)   Pulse 71   Temp 98.2 °F (36.8 °C) (Oral)   Resp 18   Ht 5' 3\" (1.6 m)   Wt 150 lb (68 kg)   SpO2 97%   BMI 26.57 kg/m²     Intake/Output Summary (Last 24 hours) at 2025 1259  Last data filed at 2025 0730  Gross per 24 hour   Intake 240 ml   Output 375 ml   Net -135 ml     General: Calm, NAD  HEENT: NCAT, no scleral icterus  CV: RR  Pulmonary: breathing unlabored, symmetric bilaterally, no audible wheezes  Abdomen: soft, NT, ND, no masses, no rebound, no guarding, no  rigidity  : Narrow introitus, urethra retracted in the midline    Procedure:  After the groin was prepped and draped, the urethra was palpated and a 14 Fr coude catheter was placed into the bladder and clear urine was returned. The balloon was inflated with sterile water and the catheter was connected to gravity bag drainage.     Laboratory Data:  Lab Results   Component Value Date    WBC 8.3 06/28/2025    HGB 8.3 06/28/2025    HCT 24.9 06/28/2025    .0 06/28/2025    CREATSERUM 0.64 06/28/2025    BUN 29 06/28/2025     06/28/2025    K 3.8 06/28/2025     06/28/2025    CO2 23.0 06/28/2025     06/28/2025    CA 8.2 06/28/2025    MG 1.8 06/28/2025    PGLU 185 06/28/2025       Imaging:       Hospital Problem List:  Problem List[6]    IMPRESSION    1. Incomplete bladder emptying with inability to place silverman catheter  -6/28/25 A 14 Fr coude catheter was placed with minimal difficulty    PLAN    1. Leave silverman until the patient is more ambulatory and then void trial  2. Urology will follow peripherally - please call with any questions.        The above impression and plan were discussed in detail with the patient who verbalized understanding and all questions were answered.    Discussed with RN staff.    Thank you for allowing me to participate in the care of your patient.    Sandeep Izquierdo D.O.  [facility] Urology      6/28/2025  12:57 PM         [1]   Past Medical History:   Aortic atherosclerosis    on LDCT    Collagenous colitis    Coronary atherosclerosis    abn cgxt had cabg 10/18/18, Dr. Martines, Dr. Neves 1/24/20, 9/20    Cyst of left kidney    Diabetes mellitus (HCC)    Emphysema lung (HCC)    no symptoms 4/13/18, sees lung doc prn    Encounter for eye exam    Dr. Gordillo    Encounter for screening for lung cancer    CT with stable nodules, recheck in 10/21 ( she quit tob in 2007)    Exertional angina    Resolved last addressed  10/2/18 Per order 10/2/18    GERD (gastroesophageal  reflux disease)    due to hiatal hernia, not bothersome 2013, 4/5/17    Herniated nucleus pulposus, L4-5 left    History of cardiovascular stress test    neg stress echo pre-cardiac rehab    History of normal resting EKG    HTN (hypertension)    Hyperlipidemia LDL goal <70    Hyperplastic colon polyp    x2, no need to repeat    Left-sided low back pain with left-sided sciatica    flare up 7/24/20    Living will in place    Lung nodules    Dr. Cervantes stable on CT 2012, new one 4/29/19, recheck 1 year    Obesity    Osteoarthritis    Other nephritis and nephropathy, not specified as acute or chronic, with specified pathological lesion in kidney    in high school    Personal history of poliomyelitis    no residual    Pneumonia, organism unspecified(486)    Polio (HCC)    no residual    Postmenopausal bleeding    endometrial bx neg with Dr. Menchaca had polyp removal    Screening for breast cancer    refuses mammos in future. no longer needed over age 75    Screening for cardiovascular condition    EF 70%, normal CGXT    Screening for osteoporosis    normal, repeat in 2020, refused 2021    Sebaceous cyst    upper neck, present for years    Shingles    L upper back   [2]   Past Surgical History:  Procedure Laterality Date    Appendectomy  1956    Cabg, artery-vein, two  10/18/2018    Dr. Martines, Dr. Salmon    Colonoscopy  6-08, 6/7/18    Dr. Galvez, normal, repeat in 2018    Colonoscopy  10/7/20= Collagenous colitis    Repeat PRN    Hip surgery Left 02/28/2020    ORIF Dr. Piedra at [facility], went home from hosp. using walker, had home PT    Hysteroscopy  2011    uterine polyp removed    Knee replacement surgery Left 9/5/14    Dr. Champion at [facility], happy with results    Remv cataract extracap,insert lens  7/23/08    Performed by CT DOLAN at [facility]    Remv cataract extracap,insert lens  8/20/08    Performed by CT DOLAN at [facility] bilateral with IOL's    Revise thumb  tendon Left 1970's    Special service or report Right     BUNIONECTOMY  AND HAMMER TOE REMOVAL   [3]   Family History  Problem Relation Age of Onset    Other (Other) Father         UROSEPSIS    Hypertension Mother     Other (Other) Mother         KIDNEY REMOVAL; OD ON PRESCRIPTION RX    Other (Other) Maternal Grandmother         PNEUMONIA    Other (Other) Maternal Grandfather         CVA    Heart Disorder Paternal Grandmother     Heart Disorder Paternal Grandfather     Obesity Paternal Grandfather     Other (Other) Son         GERD    Other (pancreatitis) Son         sphincter or Oddi    Other (knee arthritis) Son     Lipids Son 49        hypertriglyceridemia    Breast Cancer Paternal Aunt 52   [4]   Allergies  Allergen Reactions    Amlodipine SHORTNESS OF BREATH     Sweating and hair loss as well    Furadantin [Nitrofurantoin] NAUSEA ONLY     ITCHING    Pcn [Penicillins Cross Reactors] RASH and NAUSEA ONLY     ITCHING    Zyban [Bupropion Hcl] RASH   [5]   Current Facility-Administered Medications:     metoprolol tartrate (Lopressor) tab 25 mg, 25 mg, Oral, 2x Daily(Beta Blocker)    sodium chloride 0.9% infusion, , Intravenous, Continuous    sennosides (Senokot) tab 17.2 mg, 17.2 mg, Oral, Nightly    docusate sodium (Colace) cap 100 mg, 100 mg, Oral, BID    polyethylene glycol (PEG 3350) (Miralax) 17 g oral packet 17 g, 17 g, Oral, Daily PRN    magnesium hydroxide (Milk of Magnesia) 400 MG/5ML oral suspension 30 mL, 30 mL, Oral, Daily PRN    bisacodyl (Dulcolax) 10 MG rectal suppository 10 mg, 10 mg, Rectal, Daily PRN    fleet enema (Fleet) rectal enema 133 mL, 1 enema, Rectal, Once PRN    ondansetron (Zofran) 4 MG/2ML injection 4 mg, 4 mg, Intravenous, Q6H PRN    metoclopramide (Reglan) 5 mg/mL injection 5 mg, 5 mg, Intravenous, Q8H PRN    multivitamin (Tab-A-Carito/Beta Carotene) tab 1 tablet, 1 tablet, Oral, Daily    apixaban (Eliquis) tab 2.5 mg, 2.5 mg, Oral, BID@0600,1800    acetaminophen (Tylenol Extra  Strength) tab 1,000 mg, 1,000 mg, Oral, Q8H TERRY    oxyCODONE immediate release partial tab 2.5 mg, 2.5 mg, Oral, Q4H PRN **OR** oxyCODONE immediate release tab 5 mg, 5 mg, Oral, Q4H PRN    HYDROmorphone (Dilaudid) 1 MG/ML injection 0.2 mg, 0.2 mg, Intravenous, Q2H PRN **OR** HYDROmorphone (Dilaudid) 1 MG/ML injection 0.4 mg, 0.4 mg, Intravenous, Q2H PRN    [Transfer Hold] aspirin DR tab 81 mg, 81 mg, Oral, Daily    lisinopril (Zestril) tab 10 mg, 10 mg, Oral, Daily    atorvastatin (Lipitor) tab 20 mg, 20 mg, Oral, Nightly    glucose (Dex4) 15 GM/59ML oral liquid 15 g, 15 g, Oral, Q15 Min PRN **OR** glucose (Glutose) 40% oral gel 15 g, 15 g, Oral, Q15 Min PRN **OR** glucose-vitamin C (Dex-4) chewable tab 4 tablet, 4 tablet, Oral, Q15 Min PRN **OR** dextrose 50% injection 50 mL, 50 mL, Intravenous, Q15 Min PRN **OR** glucose (Dex4) 15 GM/59ML oral liquid 30 g, 30 g, Oral, Q15 Min PRN **OR** glucose (Glutose) 40% oral gel 30 g, 30 g, Oral, Q15 Min PRN **OR** glucose-vitamin C (Dex-4) chewable tab 8 tablet, 8 tablet, Oral, Q15 Min PRN    [Transfer Hold] acetaminophen (Tylenol Extra Strength) tab 500 mg, 500 mg, Oral, Q4H PRN    [Transfer Hold] morphINE PF 2 MG/ML injection 1 mg, 1 mg, Intravenous, Q2H PRN **OR** [Transfer Hold] morphINE PF 2 MG/ML injection 2 mg, 2 mg, Intravenous, Q2H PRN **OR** [Transfer Hold] morphINE PF 4 MG/ML injection 4 mg, 4 mg, Intravenous, Q2H PRN    [Transfer Hold] ondansetron (Zofran) 4 MG/2ML injection 4 mg, 4 mg, Intravenous, Q6H PRN    [Transfer Hold] metoclopramide (Reglan) 5 mg/mL injection 5 mg, 5 mg, Intravenous, Q8H PRN    insulin aspart (NovoLOG) 100 Units/mL FlexPen 1-5 Units, 1-5 Units, Subcutaneous, TID AC and HS    acetaminophen (Tylenol) tab 650 mg, 650 mg, Oral, Q4H PRN **OR** HYDROcodone-acetaminophen (Norco) 5-325 MG per tab 1 tablet, 1 tablet, Oral, Q4H PRN **OR** HYDROcodone-acetaminophen (Norco) 5-325 MG per tab 2 tablet, 2 tablet, Oral, Q4H PRN  [6]   Patient Active  Problem List  Diagnosis    Lung nodules    Height loss    Hyperlipidemia associated with type 2 diabetes mellitus (HCC)    Nonproliferative diabetic retinopathy (HCC)    Aortic atherosclerosis    Pulmonary scarring    PAD (peripheral artery disease)    Essential hypertension    Bronchiectasis without complication (HCC)    Pulmonary emphysema, unspecified emphysema type (HCC)    CAD (coronary artery disease)    Hx of CABG    Type 2 diabetes mellitus with diabetic peripheral angiopathy without gangrene, without long-term current use of insulin (HCC)    Type 2 diabetes mellitus with microalbuminuria, without long-term current use of insulin (HCC)    Periprosthetic fracture of femur at tip of prosthesis, initial encounter

## 2025-06-28 NOTE — PROGRESS NOTES
0813: Pt c/o constipation. Last BM 6/25. Miralax already given this AM per noc RN.  MOM also given.     0935: Bladder palpated and is firm and distended. Urine output overnight only 200 ml.  Bladder scan was 743 ml.  Several unsuccessful attempts to straight cath pt. Her anatomy is atrophied.   LLE  limited movement to reposition d/t the knee immobilizer/surgery. Pt crying/screaming in pain for us to stop.     1024: Suppository given. [place] the [facility] [place] equipment. She had a large BM and voided as well. Unable to accurately measure the urine as it went on the floor too.     1130: Re-checked another bladder scan at 765 ml.     1144: Message sent to Dr Yo to update her and to ask if she wants to consult urology for them to put a silverman in themselves for a few days. OR to try and continue to have other RNs attempt straight cath or for any other suggestions. Awaiting response.     1201: order received for urology consult.    1209: Dr Izquierdo paged with new consult. Awaiting response.    1211: Spoke to Dr Izquierdo. He will be up shortly to insert catheter.    1225: Pt assisted back to bed. Supplies at bedside.    1245: Dr Izquierdo here. Silverman catheter inserted and to stay in until pt up and moving more per urology.

## 2025-06-28 NOTE — PROGRESS NOTES
[facility]    Miriam Contreras Patient Status:  Inpatient    1945 MRN [id number]   Location [facility] 3SW-A Attending Andrew Stinson MD   Hosp Day # 1 PCP Juanpablo Bowles MD     Subjective:  S/P ORIF L periprosthetic femur fracture. POD #2  Systemic or Specific Complaints:No Complaints. Sitting up in bed. Pain controlled.       Objective:  Vital signs in last 24 hours:  Temp:  [98 °F (36.7 °C)-98.5 °F (36.9 °C)] 98 °F (36.7 °C)  Pulse:  [78-86] 86  Resp:  [18] 18  BP: ()/(33-50) 133/50  SpO2:  [90 %-98 %] 94 %    General: alert, appears stated age and cooperative   Wound: Dressing and immobilizer intact.    DVT Exam: Right Calf :  Nontender  Left Calf:  Nontender   Neurovascular Intact sensation and pulses     Data Review  CBC:   Lab Results   Component Value Date    WBC 8.3 2025    RBC 2.52 (L) 2025    HGB 8.3 (L) 2025    HCT 24.9 (L) 2025    .0 2025       Assessment/Plan:  Status Post left Total Knee Arthroplasty.  Continue P.T.  NWB L LE  Discharge planning.

## 2025-06-28 NOTE — CM/SW NOTE
SW called son Raghav re: ANA choice and he advised that I call his bother Yehuda [phone].    SW spoke to Yehuda - he is touring facilities and waiting to get costs of private pay once her insurance stops paying he is deciding between [facility] ( they did not let him tour and did not have pricing - ) and [facility]. He would like to discuss both with pt.    Sw to follow up re: ANA choice.  Need to start insurance auth..  Will ask DSC to start auth .    Jayshree Weiss LCSW  /Discharge Planner

## 2025-06-28 NOTE — PROGRESS NOTES
[facility] Cardiology Progress Note        Miriam Contreras Patient Status:  Observation    1945 MRN [id number]   Location [facility] 3SW-A Attending Andrew Stinson MD   Hosp Day # 1 PCP Juanpablo Bowles MD     Subjective:  The patient denies  chest pain and shortness of breath. No arrhythmia on tele.     Medications:  Scheduled Medications[1]    Continuous Infusions:  Medication Infusions[2]      Allergies:  Allergies[3]      Objective:        Intake/Output:      Intake/Output Summary (Last 24 hours) at 2025 1200  Last data filed at 2025 0730  Gross per 24 hour   Intake 480 ml   Output 375 ml   Net 105 ml     Wt Readings from Last 3 Encounters:   25 150 lb (68 kg)   22 173 lb (78.5 kg)   21 173 lb (78.5 kg)       Physical Exam:        Vitals:    25 1920 25 2300 25 0400 25 0730   BP: 100/33 105/37 133/50 133/59   BP Location: Left arm Left arm Left arm Left arm   Pulse: 84 78 86 71   Resp: 18 18 18 18   Temp: 98 °F (36.7 °C) 98.3 °F (36.8 °C) 98 °F (36.7 °C) 98.2 °F (36.8 °C)   TempSrc: Oral Oral Oral Oral   SpO2: 90% 98% 94% 97%   Weight:       Height:           Temp:  [98 °F (36.7 °C)-98.5 °F (36.9 °C)] 98.2 °F (36.8 °C)  Pulse:  [71-86] 71  Resp:  [18] 18  BP: ()/(33-59) 133/59  SpO2:  [90 %-98 %] 97 %      Temp: 98.2 °F (36.8 °C)  Pulse: 71  Resp: 18  BP: 133/59  General:  Appears comfortable  HEENT: No focal deficits.  Neck: No JVD, carotids 2+ no bruits.  Cardiac: Regular S1S2.  No S3, S4, rub, click.  No murmur.  Lungs: Clear to auscultation and percussion.  Abdomen: Soft, non-tender.   Extremities: No LE edema.  No clubbing or cyanosis.    Neurologic: Alert and oriented, normal affect.  Skin: Warm and dry.           LABS:      HEM:  Recent Labs   Lab 25  1323 25  0506 25  0441 25  0514   WBC 12.5* 7.8 8.4 8.3   HGB 13.3 10.5* 9.5* 8.3*   HCT 40.2 31.8* 29.5* 24.9*   .0 212.0 192.0 172.0        Chem:  Recent Labs   Lab 06/25/25  1323 06/26/25  0506 06/27/25  0441 06/28/25  0514    144 143 142   K 3.9 3.9 3.9 3.8    110 109 109   CO2 23.0 25.0 23.0 23.0   BUN 22 24* 23 29*   CREATSERUM 0.81 0.82 0.79 0.64   CA 9.3 8.8 8.6* 8.2*   MG  --  1.8 1.8 1.8   * 135* 142* 164*       Recent Labs   Lab 06/25/25  1323   ALT 20   AST 23   ALB 4.6       No results for input(s): \"PT\", \"PTT\", \"INR\" in the last 168 hours.        Lab Results   Component Value Date    TROP <0.017 09/14/2018         Invalid input(s): \"PBNPML\"                       Diagnostics:   Telemetry: SR    EKG, 6/27/2025,         Echo:      Cardiac Cath:              Impression:        Fall--->Left femur fx-->Open reduction internal fixation left distal periprosthetic femur fracture , 6/26/25   2. CAD, s/p 2 vessel CABG (RCA, LAD), 2018. Asymptomatic.. on asa, statin  3. Hypertension .   4. Diabetes  5. Dyslipidemia     Plan:    1.  Monitor cardiac rhythm through manisha-op period  2. Continue metoprolol to 25 mg bid  3. Continue aspirin    Cardiology will peripherally follow. Please call us with any questions.     Clarence Bhat DO  Cardiologist, [facility]             [1]    metoprolol tartrate  25 mg Oral 2x Daily(Beta Blocker)    sennosides  17.2 mg Oral Nightly    docusate sodium  100 mg Oral BID    multivitamin  1 tablet Oral Daily    apixaban  2.5 mg Oral BID@0600,1800    acetaminophen  1,000 mg Oral Q8H TERRY    [Transfer Hold] aspirin  81 mg Oral Daily    lisinopril  10 mg Oral Daily    atorvastatin  20 mg Oral Nightly    insulin aspart  1-5 Units Subcutaneous TID AC and HS   [2]    sodium chloride 83 mL/hr at 06/28/25 0531   [3]   Allergies  Allergen Reactions    Amlodipine SHORTNESS OF BREATH     Sweating and hair loss as well    Furadantin [Nitrofurantoin] NAUSEA ONLY     ITCHING    Pcn [Penicillins Cross Reactors] RASH and NAUSEA ONLY     ITCHING    Zyban [Bupropion Hcl] RASH

## 2025-06-28 NOTE — PLAN OF CARE
Pt A&O. On O2 2L per NC. Have been trying to wean to room air throughout the day today. Pt continues to desat to 85-86 % on room air. Encouraged use of incentive spirometer and ankle pump exercises every hour while awake. Tele and  monitoring maintained. Scds to RLE. Tolerating diet with good appetite. Blood sugars monitored and insulin given as ordered. Had large BM today after laxatives and suppository given. Banda catheter inserted by urology today d/t urinary retention not managed with straight cath. Pain managed with current medications. Encouraged pt to take oxycodone before activity d/t severe pain and crying out with movement/activity. Up to chair today using room lift equipment. Bed/chair alarms on. Waffle cushion in chair when up. Knee immobilizer on LLE. NWB maintained to LLE. Gel ice packs as needed for pain/swelling. Left leg/femur aquacel drsg x2 C/D/I. IVFs dc'd today. Pt plans to be dc'd to [facility] when cleared. Currently thinking about [facility] vs [facility]. Will need insurance auth for dc. Rx for oxy on chart.

## 2025-06-29 ENCOUNTER — APPOINTMENT (OUTPATIENT)
Dept: ULTRASOUND IMAGING | Facility: HOSPITAL | Age: 80
DRG: 481 | End: 2025-06-29
Attending: ORTHOPAEDIC SURGERY
Payer: MEDICARE

## 2025-06-29 LAB
ANION GAP SERPL CALC-SCNC: 8 MMOL/L (ref 0–18)
BUN BLD-MCNC: 20 MG/DL (ref 9–23)
CALCIUM BLD-MCNC: 8.2 MG/DL (ref 8.7–10.6)
CHLORIDE SERPL-SCNC: 109 MMOL/L (ref 98–112)
CO2 SERPL-SCNC: 24 MMOL/L (ref 21–32)
CREAT BLD-MCNC: 0.55 MG/DL (ref 0.55–1.02)
EGFRCR SERPLBLD CKD-EPI 2021: 93 ML/MIN/1.73M2 (ref 60–?)
ERYTHROCYTE [DISTWIDTH] IN BLOOD BY AUTOMATED COUNT: 15.9 %
GLUCOSE BLD-MCNC: 130 MG/DL (ref 70–99)
GLUCOSE BLD-MCNC: 132 MG/DL (ref 70–99)
GLUCOSE BLD-MCNC: 144 MG/DL (ref 70–99)
GLUCOSE BLD-MCNC: 145 MG/DL (ref 70–99)
GLUCOSE BLD-MCNC: 152 MG/DL (ref 70–99)
HCT VFR BLD AUTO: 24.8 % (ref 35–48)
HGB BLD-MCNC: 8 G/DL (ref 12–16)
MAGNESIUM SERPL-MCNC: 2.1 MG/DL (ref 1.6–2.6)
MCH RBC QN AUTO: 32.1 PG (ref 26–34)
MCHC RBC AUTO-ENTMCNC: 32.3 G/DL (ref 31–37)
MCV RBC AUTO: 99.6 FL (ref 80–100)
OSMOLALITY SERPL CALC.SUM OF ELEC: 297 MOSM/KG (ref 275–295)
PLATELET # BLD AUTO: 190 10(3)UL (ref 150–450)
POTASSIUM SERPL-SCNC: 4 MMOL/L (ref 3.5–5.1)
RBC # BLD AUTO: 2.49 X10(6)UL (ref 3.8–5.3)
SODIUM SERPL-SCNC: 141 MMOL/L (ref 136–145)
WBC # BLD AUTO: 7.8 X10(3) UL (ref 4–11)

## 2025-06-29 PROCEDURE — 93971 EXTREMITY STUDY: CPT | Performed by: ORTHOPAEDIC SURGERY

## 2025-06-29 PROCEDURE — 94760 N-INVAS EAR/PLS OXIMETRY 1: CPT

## 2025-06-29 PROCEDURE — 85027 COMPLETE CBC AUTOMATED: CPT | Performed by: HOSPITALIST

## 2025-06-29 PROCEDURE — 82962 GLUCOSE BLOOD TEST: CPT

## 2025-06-29 PROCEDURE — 83735 ASSAY OF MAGNESIUM: CPT | Performed by: HOSPITALIST

## 2025-06-29 PROCEDURE — 80048 BASIC METABOLIC PNL TOTAL CA: CPT | Performed by: HOSPITALIST

## 2025-06-29 NOTE — PROGRESS NOTES
[facility]    Miriam Contreras Patient Status:  Inpatient    1945 MRN [id number]   Location [facility] 3SW-A Attending Chayito Yo MD   Hosp Day # 2 PCP Juanpablo Bowles MD     Subjective:  S/P ORIF L periprosthetic femur fracture. POD #3  Systemic or Specific Complaints:Resting comfortably in bed. Pain controlled at rest.       Objective:  Vital signs in last 24 hours:  Temp:  [97.7 °F (36.5 °C)-98.1 °F (36.7 °C)] 97.9 °F (36.6 °C)  Pulse:  [74-82] 78  Resp:  [18-20] 18  BP: (122-160)/(46-75) 160/75  SpO2:  [89 %-94 %] 89 %    General: alert, appears stated age and cooperative   Wound: Dressing and immobilizer in place.    DVT Exam: Right Calf :  Nontender  Left Calf:  Tender to palpation   Neurovascular Intact sensation and pulses     Data Review  CBC:   Lab Results   Component Value Date    WBC 7.8 2025    RBC 2.49 (L) 2025    HGB 8.0 (L) 2025    HCT 24.8 (L) 2025    .0 2025       Assessment/Plan:  S/P ORIF L periprosthetic femur fracture. POD #3  Venous doppler ordered to R/O DVT  NWB R LE  Discharge planning.

## 2025-06-29 NOTE — PLAN OF CARE
Aox4. VSS. On 2LO2. Telemetry monitoring. Tolerating diet. Pain controlled with scheduled meds. Dressing to Left Hip: aquacell C/D/I. Immobilizer in place NWB. Banda in place. Plan monitor urine output, pain control. Pt plan to DC to [facility] when medically cleared. POC reviewed with pt and safety measures in place.

## 2025-06-29 NOTE — CM/SW NOTE
FELIPE called pt's son Yehuda to follow up on rehab choice. [phone]. CHON left requesting call back.     Per chart review, pt is interested in a facility that she could stay private pay if needed once rehab coverage stops. Son was interested in [facility] in [place] and [facility] in [place].     Per [facility] liaison, pt's son was able to tour their [place] facility. It does not appear pt's son has been able to tour [facility] [place]. [facility] contacted to follow up on tour/private pay pricing.     Addendum: 1655: Pt's son Raghav called to follow up on ANA choice. CHON left requesting call back, as no call back was received from Yehuda.     Pt will need insurance authorization for ANA with her [employer] Medicare plan.     FELIPE/TIANNA will remain available for DC planning and/or support.     BLADIMIR Garcia, RN    [phone]

## 2025-06-29 NOTE — PLAN OF CARE
Pt A&Ox4. VSS on 2L O2 NC. Telemetry monitoring. Aquacel dressing x2 to left hip intact and dry. Immobilizer in place, NWB status. Banda catheter in place for retention. PT rec rehab, pending ins auth and ANA choice. Call light within reach. Will continue to monitor.

## 2025-06-29 NOTE — PROGRESS NOTES
[facility] Hospitalist Progress Note                                                                     [facility]   part of [facility]      Miriam Contreras  8/31/1945    SUBJECTIVE: urine retention noted yesterday and difficulty placing catheter. Urology consulted. Pt reports feeling okay today. Continues with left leg pain    OBJECTIVE:  Temp:  [97.7 °F (36.5 °C)-99.2 °F (37.3 °C)] 99.2 °F (37.3 °C)  Pulse:  [74-82] 78  Resp:  [18-20] 18  BP: (122-160)/(46-75) 160/75  SpO2:  [89 %-100 %] 100 %  Exam  Gen: No acute distress, alert and oriented  Pulm: Lungs clear bilaterally, normal respiratory effort, no crackles, no wheezing  CV: Heart with regular rate and rhythm, no murmur.   Abd: Abdomen mildly distended, nontender  MSK: left leg in immobilizer  Skin: no new rashes or lesions    Labs:   Recent Labs   Lab 06/25/25  1323 06/26/25  0506 06/27/25  0441 06/28/25  0514 06/29/25  0446   WBC 12.5* 7.8 8.4 8.3 7.8   HGB 13.3 10.5* 9.5* 8.3* 8.0*   MCV 98.3 98.5 101.7* 98.8 99.6   .0 212.0 192.0 172.0 190.0       Recent Labs   Lab 06/25/25  1323 06/26/25  0506 06/27/25  0441 06/28/25  0514 06/29/25  0446    144 143 142 141   K 3.9 3.9 3.9 3.8 4.0    110 109 109 109   CO2 23.0 25.0 23.0 23.0 24.0   BUN 22 24* 23 29* 20   CREATSERUM 0.81 0.82 0.79 0.64 0.55   CA 9.3 8.8 8.6* 8.2* 8.2*   MG  --  1.8 1.8 1.8 2.1   * 135* 142* 164* 144*       Recent Labs   Lab 06/25/25  1323   ALT 20   AST 23   ALB 4.6       Recent Labs   Lab 06/28/25  0517 06/28/25  1204 06/28/25  1636 06/28/25  2120 06/29/25  0447   PGLU 176* 185* 109* 169* 152*       Meds:   Scheduled: Scheduled Medications[1]  Continuous Infusions: Medication Infusions[2]  PRN: PRN Medications[3]    ASSESSMENT / PLAN:   79 year old female with history of coronary disease with bypass, type 2 diabetes, emphysema, GERD, hypertension, hyperlipidemia, osteoarthritis, history of polio presenting with  left lower extremity pain.     Left Femoral fracture  -ortho consulted--> s/p open reduction internal fixation left distal periprosthetic femur fracture 6/26  -prn pain meds, bowel regimen, IS. Silverman in place  -PT/OT--> possible ANA  -dopplers ordered per ortho. On low dose eliquis vte prophy per ortho     Fall  -mechanical  -no further eval needed at this time    Urine retention- up to 700s on bladder scan  - urology consulted and silverman placed via coude on 6/28 (bedside nursing unable to place)     CAD hx  -asa  -atorvastatin   -BB, cards following     HTN  -sbp stable  -lisinopril--> hold for low BP  -on metop 25 bid at reduced dose     HLD  -atorvastatin      Type 2 DM  -hold home meds  -low dose insulin sliding scale    Hypoxia  -likely secondary to atelectasis  -encourage IS  -titrate o2 as tolerated--> now on RA sometimes, other times needing 2L     Quality:  DVT Prophylaxis: scd, eliquis 2.5 bid  CODE status: Full   Silverman: none     Plan of care discussed with patient      Dispo: no discharge     Chayito Yo MD  [facility] Hospitalist  [phone]           [1]    metoprolol tartrate  25 mg Oral 2x Daily(Beta Blocker)    sennosides  17.2 mg Oral Nightly    docusate sodium  100 mg Oral BID    multivitamin  1 tablet Oral Daily    apixaban  2.5 mg Oral BID@0600,1800    acetaminophen  1,000 mg Oral Q8H TERRY    [Transfer Hold] aspirin  81 mg Oral Daily    lisinopril  10 mg Oral Daily    atorvastatin  20 mg Oral Nightly    insulin aspart  1-5 Units Subcutaneous TID AC and HS   [2] [3]   polyethylene glycol (PEG 3350)    magnesium hydroxide    bisacodyl    fleet enema    ondansetron    metoclopramide    oxyCODONE **OR** oxyCODONE    HYDROmorphone **OR** HYDROmorphone    glucose **OR** glucose **OR** glucose-vitamin C **OR** dextrose **OR** glucose **OR** glucose **OR** glucose-vitamin C    [Transfer Hold] acetaminophen    [Transfer Hold] morphINE **OR** [Transfer Hold] morphINE **OR** [Transfer Hold] morphINE    [Transfer  Hold] ondansetron    [Transfer Hold] metoclopramide    acetaminophen **OR** HYDROcodone-acetaminophen **OR** HYDROcodone-acetaminophen

## 2025-06-30 LAB
ANION GAP SERPL CALC-SCNC: 8 MMOL/L (ref 0–18)
BUN BLD-MCNC: 18 MG/DL (ref 9–23)
CALCIUM BLD-MCNC: 8.2 MG/DL (ref 8.7–10.6)
CHLORIDE SERPL-SCNC: 109 MMOL/L (ref 98–112)
CO2 SERPL-SCNC: 24 MMOL/L (ref 21–32)
CREAT BLD-MCNC: 0.5 MG/DL (ref 0.55–1.02)
EGFRCR SERPLBLD CKD-EPI 2021: 95 ML/MIN/1.73M2 (ref 60–?)
ERYTHROCYTE [DISTWIDTH] IN BLOOD BY AUTOMATED COUNT: 15.6 %
GLUCOSE BLD-MCNC: 128 MG/DL (ref 70–99)
GLUCOSE BLD-MCNC: 130 MG/DL (ref 70–99)
GLUCOSE BLD-MCNC: 138 MG/DL (ref 70–99)
GLUCOSE BLD-MCNC: 146 MG/DL (ref 70–99)
GLUCOSE BLD-MCNC: 170 MG/DL (ref 70–99)
HCT VFR BLD AUTO: 23.1 % (ref 35–48)
HGB BLD-MCNC: 7.7 G/DL (ref 12–16)
MCH RBC QN AUTO: 33 PG (ref 26–34)
MCHC RBC AUTO-ENTMCNC: 33.3 G/DL (ref 31–37)
MCV RBC AUTO: 99.1 FL (ref 80–100)
OSMOLALITY SERPL CALC.SUM OF ELEC: 296 MOSM/KG (ref 275–295)
PLATELET # BLD AUTO: 208 10(3)UL (ref 150–450)
POTASSIUM SERPL-SCNC: 3.9 MMOL/L (ref 3.5–5.1)
RBC # BLD AUTO: 2.33 X10(6)UL (ref 3.8–5.3)
SODIUM SERPL-SCNC: 141 MMOL/L (ref 136–145)
WBC # BLD AUTO: 6.1 X10(3) UL (ref 4–11)

## 2025-06-30 PROCEDURE — 85027 COMPLETE CBC AUTOMATED: CPT | Performed by: HOSPITALIST

## 2025-06-30 PROCEDURE — 97112 NEUROMUSCULAR REEDUCATION: CPT

## 2025-06-30 PROCEDURE — 97530 THERAPEUTIC ACTIVITIES: CPT

## 2025-06-30 PROCEDURE — 82962 GLUCOSE BLOOD TEST: CPT

## 2025-06-30 PROCEDURE — 94760 N-INVAS EAR/PLS OXIMETRY 1: CPT

## 2025-06-30 PROCEDURE — 80048 BASIC METABOLIC PNL TOTAL CA: CPT | Performed by: HOSPITALIST

## 2025-06-30 NOTE — PLAN OF CARE
Pt A&Ox4, on 1L O2 NC, VSS. POD#4 of L ORIF. Aquacel x2 C/D/I. Immobilizer in place Total Lift for transfers. Silverman in place. Pt reporting mod pain. Pain managed w/ Scheduled and Prn medications (Refer to MAR). Plan: Awaiting Insurance authorization for ANA, promote ambulation and movement as tolerated, monitoring BP, monitoring silverman. POC discussed w/ pt, all questions answered. Call light within reach, safety parameters in place.     03-Oct-2022 18:28

## 2025-06-30 NOTE — CM/SW NOTE
Spoke with Francy from [facility] who stated they have received insurance auth (valid through 7/5) and can accept pt into a private room at their [place] location today. Updated RN who stated pt received IV pain medication today due to severe pain after working with therapy. Will need to have pain controlled on oral medications in order to be appropriate for discharge to [facility]. Await medical clearance for discharge. / to remain available for support and/or discharge planning.     [facility] [place]  [phone]    Monica Bullock [facility]  Discharge Planner  [phone]

## 2025-06-30 NOTE — CONGREGATE LIVING REVIEW
[facility] Living Authorization    The [facility] Review Committee has reviewed this case and the patient IS APPROVED for discharge to a facility for Short Term Skilled once the following procedure is followed:     - The physician discharge instructions (contained within the [facility] note for SNF) must inlcude the below appropriate and approved COVID instructions to the facility    For questions regarding CLRC approval process, please contact the CM assigned to the case.  For questions regarding RN discharge workflow, please contact the unit Clinical Leader.

## 2025-06-30 NOTE — OPERATIVE REPORT
Orthopedic surgery progress note    Miriam Contreras Patient Status:  Inpatient    1945 MRN [id number]   [facility] 3SW-A Attending Chayito Yo MD   Hosp Day # 3 PCP Juanpablo Bowles MD       Subjective:  +left hip and knee pain.  No calf pain, CP, SOB, lightheadedness, nausea.      Physical Exam:  Temp:  [97.7 °F (36.5 °C)-98.7 °F (37.1 °C)] 97.7 °F (36.5 °C)  Pulse:  [61-73] 73  Resp:  [18] 18  BP: (109-138)/(39-63) 109/39  SpO2:  [91 %-96 %] 91 %    In no acute distress  Knee dressing is intact.  No signs of infection  Thigh and leg are soft.  Both calves are NT.  No signs of DVT.  NVI.      Recent Labs   Lab 25  0506 25  0441 25  0514 25  0446 25  0415   RBC 3.23* 2.90* 2.52* 2.49* 2.33*   HGB 10.5* 9.5* 8.3* 8.0* 7.7*   HCT 31.8* 29.5* 24.9* 24.8* 23.1*   MCV 98.5 101.7* 98.8 99.6 99.1   MCH 32.5 32.8 32.9 32.1 33.0   MCHC 33.0 32.2 33.3 32.3 33.3   RDW 16.6 16.4 16.0 15.9 15.6   NEPRELIM 5.97 6.74 6.16  --   --    WBC 7.8 8.4 8.3 7.8 6.1   .0 192.0 172.0 190.0 208.0         Recent Labs   Lab 25  1323 25  0506 25  0514 25  0446 25  0415   *   < > 164* 144* 128*   BUN 22   < > 29* 20 18   CREATSERUM 0.81   < > 0.64 0.55 0.50*   EGFRCR 73   < > 89 93 95   CA 9.3   < > 8.2* 8.2* 8.2*   ALB 4.6  --   --   --   --       < > 142 141 141   K 3.9   < > 3.8 4.0 3.9      < > 109 109 109   CO2 23.0   < > 23.0 24.0 24.0   ALKPHO 91  --   --   --   --    AST 23  --   --   --   --    ALT 20  --   --   --   --    BILT 0.7  --   --   --   --    TP 7.3  --   --   --   --     < > = values in this interval not displayed.     No results for input(s): \"PTP\", \"INR\", \"PTT\" in the last 168 hours.  S/p ORIF left periprosthetic distal femur fracture    NWB  PT/OT  DVT prophylaxis  Anticipate DC to ANA.    Severe left hip OA with intra-articular left hip screw:  This is a chronic situation.  May need to have THR at some point in the  future.    Will sign off for now.    Bunny Del Castillo MD  [facility]  6/30/2025  4:24 PM

## 2025-06-30 NOTE — CM/SW NOTE
Received call from pt's son, Yehuda who stated preference for [facility] in [place]. He is requesting private room. Facility reserved in [facility]. Message sent via [employer] asking that they contact insurance for auth. Informed them of pt/family preference for private room if available. Updates added to [facility] referral. Informed therapy that updated notes are needed for insurance auth. Await auth for discharge. / to remain available for support and/or discharge planning.     [facility] [place]  [phone]    Monica Bullock [facility]  Discharge Planner  [phone]

## 2025-06-30 NOTE — PROGRESS NOTES
[facility] Hospitalist Progress Note                                                                     [facility]   part of [facility]      Miriam Contreras  8/31/1945    SUBJECTIVE: today reporting discomfort in R buttock which is new but occurred towards end of PT session.    OBJECTIVE:  Temp:  [97.4 °F (36.3 °C)-98.7 °F (37.1 °C)] 97.8 °F (36.6 °C)  Pulse:  [61-70] 62  Resp:  [16-20] 18  BP: (106-142)/(47-71) 136/52  SpO2:  [92 %-100 %] 95 %  Exam  Gen: No acute distress, alert and oriented  Pulm: Lungs clear bilaterally, normal respiratory effort, no crackles, no wheezing  CV: Heart with regular rate and rhythm, no murmur.   Abd: Abdomen mildly distended, nontender  MSK: left leg in immobilizer  Skin: no new rashes or lesions    Labs:   Recent Labs   Lab 06/26/25  0506 06/27/25  0441 06/28/25  0514 06/29/25  0446 06/30/25  0415   WBC 7.8 8.4 8.3 7.8 6.1   HGB 10.5* 9.5* 8.3* 8.0* 7.7*   MCV 98.5 101.7* 98.8 99.6 99.1   .0 192.0 172.0 190.0 208.0       Recent Labs   Lab 06/26/25  0506 06/27/25  0441 06/28/25  0514 06/29/25  0446 06/30/25  0415    143 142 141 141   K 3.9 3.9 3.8 4.0 3.9    109 109 109 109   CO2 25.0 23.0 23.0 24.0 24.0   BUN 24* 23 29* 20 18   CREATSERUM 0.82 0.79 0.64 0.55 0.50*   CA 8.8 8.6* 8.2* 8.2* 8.2*   MG 1.8 1.8 1.8 2.1  --    * 142* 164* 144* 128*       Recent Labs   Lab 06/25/25  1323   ALT 20   AST 23   ALB 4.6       Recent Labs   Lab 06/29/25  0447 06/29/25  1137 06/29/25  1647 06/29/25  2114 06/30/25  0504   PGLU 152* 132* 130* 145* 138*       Meds:   Scheduled: Scheduled Medications[1]  Continuous Infusions: Medication Infusions[2]  PRN: PRN Medications[3]    ASSESSMENT / PLAN:   79 year old female with history of coronary disease with bypass, type 2 diabetes, emphysema, GERD, hypertension, hyperlipidemia, osteoarthritis, history of polio presenting with left lower extremity pain.     Left Femoral  fracture  -ortho consulted--> s/p open reduction internal fixation left distal periprosthetic femur fracture 6/26  -prn pain meds, bowel regimen, IS. Silverman in place  -PT/OT--> possible ANA  -dopplers neg for LLE DVT. On low dose eliquis vte prophy per ortho     Fall  -mechanical  -no further eval needed at this time    Urine retention- up to 700s on bladder scan  - urology consulted and silverman placed via coude on 6/28 (bedside nursing unable to place)     CAD hx  -asa  -atorvastatin   -BB, cards following     HTN  -sbp stable  -lisinopril--> hold for low BP  -on metop 25 bid at reduced dose     HLD  -atorvastatin      Type 2 DM  -hold home meds  -low dose insulin sliding scale    Hypoxia  -likely secondary to atelectasis  -encourage IS  -titrate o2 as tolerated--> now on RA sometimes, other times needing 2L     Quality:  DVT Prophylaxis: scd, eliquis 2.5 bid  CODE status: Full   Silverman: none     Plan of care discussed with patient      Dispo: await ANA Yo MD  formerly [facility] Hospitalist  [phone]           [1]    metoprolol tartrate  25 mg Oral 2x Daily(Beta Blocker)    sennosides  17.2 mg Oral Nightly    docusate sodium  100 mg Oral BID    multivitamin  1 tablet Oral Daily    apixaban  2.5 mg Oral BID@0600,1800    acetaminophen  1,000 mg Oral Q8H TERRY    [Transfer Hold] aspirin  81 mg Oral Daily    lisinopril  10 mg Oral Daily    atorvastatin  20 mg Oral Nightly    insulin aspart  1-5 Units Subcutaneous TID AC and HS   [2] [3]   polyethylene glycol (PEG 3350)    magnesium hydroxide    bisacodyl    fleet enema    ondansetron    metoclopramide    oxyCODONE **OR** oxyCODONE    HYDROmorphone **OR** HYDROmorphone    glucose **OR** glucose **OR** glucose-vitamin C **OR** dextrose **OR** glucose **OR** glucose **OR** glucose-vitamin C    [Transfer Hold] acetaminophen    [Transfer Hold] morphINE **OR** [Transfer Hold] morphINE **OR** [Transfer Hold] morphINE    [Transfer Hold] ondansetron    [Transfer Hold]  metoclopramide    acetaminophen **OR** HYDROcodone-acetaminophen **OR** HYDROcodone-acetaminophen

## 2025-06-30 NOTE — PLAN OF CARE
A&Ox4. VSS. On 1L O2 via NC. /IS. Telemetry monitoring. SCD to RLE. Ankle pumps encouraged. Tolerating diet. Last BM 6/29. Banda catheter in place d/t urinary retention. Pain managed with PO medication. Aquacel x2 to LLE C/D/I, immobilizer in place. Plan is for dc to [facility] - insurance auth pending. Patient updated and in agreement with plan of care. Safety precautions in place. Instructed patient to call for assistance, call light within reach.

## 2025-06-30 NOTE — OCCUPATIONAL THERAPY NOTE
OCCUPATIONAL THERAPY TREATMENT NOTE - INPATIENT     Room Number: 386/386-A  Session: 1   Number of Visits to Meet Established Goals: 7    Presenting Problem: Falls, displaced oblique supracondylar periprosthetic distal femur fracture.    HOME SITUATION  Type of Home: House  Home Layout: Multi-level  Lives With: Alone     Toilet and Equipment: Comfort height toilet  Shower/Tub and Equipment: Walk-in shower, Tub-shower combo  Other Equipment:  (RW)     Occupation/Status: retired  Hand Dominance: Right  Drives: No  Patient Regularly Uses: Reading glasses     Prior Level of Function: mod I with ADLs and functional mobility with use of cane or walker.  Patient reports no other falls other than the fall that brought her in this admit.  Patient reports she orders her groceries for delivery and does own cooking, cleaning, laundry.      ASSESSMENT   Patient demonstrates limited progress this session, goals remain in progress.    Patient continues to function below baseline with ADLs and functional transfers.   Contributing factors to remaining limitations include decreased functional strength, decreased functional reach, decreased endurance, pain, impaired sitting balance, and decreased muscular endurance.  Next session anticipate patient to progress bed mobility, transfers, static sitting balance, dynamic sitting balance, maintaining seated position, and energy conservation strategies.  Occupational Therapy will continue to follow patient for duration of hospitalization.    Patient continues to benefit from continued skilled OT services: to promote return to prior level of function and safety with continuous assistance and gradual rehabilitative therapy.     History: Patient is a 79 year old female admitted on 6/25/2025 with Presenting Problem: Falls, displaced oblique supracondylar periprosthetic distal femur fracture.. Co-Morbidities : DM2, emphysema, GERD, HTN, polio    WEIGHT BEARING RESTRICTION  L Lower Extremity:  Non-Weight Bearing      Recommendations for nursing staff:   Transfers: total lift  Toileting location: bed level    TREATMENT SESSION:  Patient Start of Session: semi supine in bed    FUNCTIONAL TRANSFER ASSESSMENT  Sit to Stand: Edge of Bed; Chair  Edge of Bed: Not Tested  Chair: Not Tested    BED MOBILITY  Rolling: Maximum Assist  Supine to Sit : Dependent  Sit to Supine (OT): Dependent  Scooting: Max A/Dep    BALANCE ASSESSMENT  Static Sitting: Stand-by Assist    FUNCTIONAL ADL ASSESSMENT  Eating: Not Tested  Grooming Seated: Not Tested  LB Dressing Seated: Not Tested  Toileting Seated: Not Tested    ACTIVITY TOLERANCE: fair                         O2 SATURATIONS       EDUCATION PROVIDED  Patient Education : Role of Occupational Therapy; Plan of Care; Discharge Recommendations; Functional Transfer Techniques; Fall Prevention; Weight Bear Status; Posture/Positioning; Energy Conservation; Proper Body Mechanics  Patient's Response to Education: Verbalized Understanding; Requires Further Education    Therapist comments: Pt received semi supine in bed, pleasant and cooperative for OT session. Pt encouraged to participate in EOB activity in preparation for ADLs and functional transfers. Pt requires total assist/max A x 2 to sit EOB with L LE support due to increased pain with mobility. Pt requires max A-SBA for sitting balance/core strengthening. Pt trials to scoot to HOB in preparation for lateral transfer to bedside chair/commode, however pt is requiring max A x 2. Noted pt is unable to initiate B UE and R LE engagement. Pt requires 2 person assist top return to supine and max A x 2 to reposition to HOB.    Patient End of Session: In bed, Needs met, Call light within reach, RN aware of session/findings, All patient questions and concerns addressed, Hospital anti-slip socks, SCDs in place, Alarm set    SUBJECTIVE  Pt screaming in pain with moving EOB.    PAIN ASSESSMENT  Ratin  Location: L LE  Management  Techniques: Nurse notified, Relaxation, Repositioning, Activity promotion (nurse provided pain meds prior to session)     OBJECTIVE  Precautions: Bed/chair alarm (LLE knee immobilzer (no orders but on pt on day of eval))    AM-PAC ‘6-Clicks’ Inpatient Daily Activity Short Form  -   Putting on and taking off regular lower body clothing?: Total  -   Bathing (including washing, rinsing, drying)?: Total  -   Toileting, which includes using toilet, bedpan or urinal? : Total  -   Putting on and taking off regular upper body clothing?: A Little  -   Taking care of personal grooming such as brushing teeth?: A Little  -   Eating meals?: A Little    AM-PAC Score:  Score: 12  Approx Degree of Impairment: 66.57%  Standardized Score (AM-PAC Scale): 30.6    PLAN  OT Device Recommendations: TBD  OT Treatment Plan: Balance activities, Energy conservation/work simplification techniques, ADL training, Functional transfer training, UE strengthening/ROM, Endurance training, Patient/Family education, Patient/Family training, Cognitive reorientation, Equipment eval/education, Compensatory technique education  Rehab Potential : Good  Frequency: 3-5x/week    OT Goals:     All goals ongoing 06/30    ADL Goals  Patient will perform toileting with mod A and AE PRN  Patient will perform LB dressing with mod A and AE PRN     Functional Transfer Goals  Patient will perform bed mobility supine to sit with mod A  Patient will perform bed mobility sit to supine with mod A  Patient will perform commode transfer with mod A while adhering to NWB    OT Session Time: 30 minutes  Therapeutic Activity: 30 minutes

## 2025-06-30 NOTE — PHYSICAL THERAPY NOTE
PHYSICAL THERAPY TREATMENT NOTE - INPATIENT    Room Number: 386/386-A     Session: 1     Number of Visits to Meet Established Goals: 10    Presenting Problem: Fall, L femoral neck fracture s/p ORIF 6/26  Co-Morbidities : DM2, emphysema, GERD, HTN, polio    PHYSICAL THERAPY ASSESSMENT   Patient demonstrates limited progress this session, goals  remain in progress.      Patient is requiring maximum assist and dependent as a result of the following impairments: decreased functional strength, decreased endurance/aerobic capacity, pain, decreased muscular endurance, and limited LLE ROM.     Patient continues to function below baseline with bed mobility, transfers, and gait.  Next session anticipate patient to progress bed mobility and transfers.  Physical Therapy will continue to follow patient for duration of hospitalization.    Patient continues to benefit from continued skilled PT services: to promote return to prior level of function and safety with continuous assistance and gradual rehabilitative therapy .    PLAN DURING HOSPITALIZATION  Nursing Mobility Recommendation : Lift Equipment     PT Treatment Plan: Bed mobility, Body mechanics, Endurance, Energy conservation, Patient education, Family education, Gait training, Transfer training, Balance training, Strengthening  Frequency (Obs): 3x/week     CURRENT GOALS       Goal #1 Patient is able to demonstrate supine - sit EOB @ level: moderate assistance      Goal #2 Patient is able to demonstrate transfers EOB to/from Chair/Wheelchair at assistance level: minimum assistance      Goal #3        Goal #4     Goal #5     Goal #6     Goal Comments: Goals established on 6/27/2025 6/30/2025 all goals ongoing     SUBJECTIVE  \"I don't have pain when I am laying here\"     OBJECTIVE  Precautions: Bed/chair alarm (LLE knee immobilzer (no orders but on pt on day of eval))    WEIGHT BEARING RESTRICTION  L Lower Extremity: Non-Weight Bearing    PAIN ASSESSMENT   Rating: Unable to  rate  Location: LLE with movement  Management Techniques: Body mechanics, Breathing techniques, Relaxation, Repositioning    BALANCE                                                                                                                       Static Sitting: Poor +  Dynamic Sitting: Poor +           Static Standing: Not tested  Dynamic Standing: Not tested    ACTIVITY TOLERANCE                         O2 WALK       AM-PAC '6-Clicks' INPATIENT SHORT FORM - BASIC MOBILITY  How much difficulty does the patient currently have...  Patient Difficulty: Turning over in bed (including adjusting bedclothes, sheets and blankets)?: A Lot   Patient Difficulty: Sitting down on and standing up from a chair with arms (e.g., wheelchair, bedside commode, etc.): Unable   Patient Difficulty: Moving from lying on back to sitting on the side of the bed?: A Lot   How much help from another person does the patient currently need...   Help from Another: Moving to and from a bed to a chair (including a wheelchair)?: A Lot   Help from Another: Need to walk in hospital room?: Total   Help from Another: Climbing 3-5 steps with a railing?: Total     AM-PAC Score:  Raw Score: 9   Approx Degree of Impairment: 81.38%   Standardized Score (AM-PAC Scale): 30.55   CMS Modifier (G-Code): CM    FUNCTIONAL ABILITY STATUS  Gait Assessment   Functional Mobility/Gait Assessment  Gait Assistance: Not tested    Skilled Therapy Provided  Pt presents in bed , KI donned     Therapist educated pt on NWB LLE  Noted KI poorly fitted, therapist adjusted and placed more appropriately.  Pt requires max A x 2 for bed mobility,t/f and scooting   Therapist cued pt for use of UE to off load hips with poor return demo   Pt was unable to make progress with lateral scoot despite max A and support of LLE.   Static seated  balance fair- to poor +  Dynamic seated balance poor+   Pt yells out, however, does not report increase in pain. Pt required TA to reposition in  bed    Bed Mobility:  Rolling: nt    Supine<>Sit: max x 2    Sit<>Supine: max x 2     Transfer Mobility:  Sit<>Stand: nt    Stand<>Sit:    Gait:     Therapist's Comments: pt could benefit from a smaller size KI for improved fit       THERAPEUTIC EXERCISES  Lower Extremity Ankle pumps     Upper Extremity      Position Supine     Repetitions   10   Sets   1     Patient End of Session: In bed, Needs met, Call light within reach, RN aware of session/findings, All patient questions and concerns addressed, Hospital anti-slip socks, Alarm set, SCDs in place, Ice applied    PT Session Time: 25 minutes  Gait Training:  minutes  Therapeutic Activity: 25 minutes  Therapeutic Exercise:  minutes   Neuromuscular Re-education:  minutes

## 2025-07-01 VITALS
HEIGHT: 63 IN | DIASTOLIC BLOOD PRESSURE: 53 MMHG | OXYGEN SATURATION: 93 % | SYSTOLIC BLOOD PRESSURE: 131 MMHG | BODY MASS INDEX: 29.29 KG/M2 | HEART RATE: 60 BPM | WEIGHT: 165.31 LBS | RESPIRATION RATE: 16 BRPM | TEMPERATURE: 98 F

## 2025-07-01 LAB
ANION GAP SERPL CALC-SCNC: 9 MMOL/L (ref 0–18)
BUN BLD-MCNC: 14 MG/DL (ref 9–23)
CALCIUM BLD-MCNC: 8.5 MG/DL (ref 8.7–10.6)
CHLORIDE SERPL-SCNC: 107 MMOL/L (ref 98–112)
CO2 SERPL-SCNC: 25 MMOL/L (ref 21–32)
CREAT BLD-MCNC: 0.52 MG/DL (ref 0.55–1.02)
EGFRCR SERPLBLD CKD-EPI 2021: 94 ML/MIN/1.73M2 (ref 60–?)
ERYTHROCYTE [DISTWIDTH] IN BLOOD BY AUTOMATED COUNT: 15.1 %
GLUCOSE BLD-MCNC: 117 MG/DL (ref 70–99)
GLUCOSE BLD-MCNC: 118 MG/DL (ref 70–99)
HCT VFR BLD AUTO: 24.8 % (ref 35–48)
HGB BLD-MCNC: 8.2 G/DL (ref 12–16)
MCH RBC QN AUTO: 32.5 PG (ref 26–34)
MCHC RBC AUTO-ENTMCNC: 33.1 G/DL (ref 31–37)
MCV RBC AUTO: 98.4 FL (ref 80–100)
OSMOLALITY SERPL CALC.SUM OF ELEC: 294 MOSM/KG (ref 275–295)
PLATELET # BLD AUTO: 261 10(3)UL (ref 150–450)
POTASSIUM SERPL-SCNC: 4 MMOL/L (ref 3.5–5.1)
RBC # BLD AUTO: 2.52 X10(6)UL (ref 3.8–5.3)
SODIUM SERPL-SCNC: 141 MMOL/L (ref 136–145)
WBC # BLD AUTO: 5.6 X10(3) UL (ref 4–11)

## 2025-07-01 PROCEDURE — 80048 BASIC METABOLIC PNL TOTAL CA: CPT | Performed by: HOSPITALIST

## 2025-07-01 PROCEDURE — 85027 COMPLETE CBC AUTOMATED: CPT | Performed by: HOSPITALIST

## 2025-07-01 PROCEDURE — 82962 GLUCOSE BLOOD TEST: CPT

## 2025-07-01 NOTE — PAYOR COMM NOTE
--------------  DISCHARGE REVIEW    Payor: [employer] [facility]  Subscriber #:  [id number]  Authorization Number: [id number]    Admit date: 6/27/25  Admit time:   9:46 AM  Discharge Date: 7/1/2025 11:48 AM     Admitting Physician: Andrew Stinson MD  Attending Physician:  No att. providers found  Primary Care Physician: Juanpablo Bowles MD          Discharge Summary Notes        Discharge Summary signed by Fausto Malloy DO at 7/1/2025 10:16 AM       Author: Fausto Malloy DO Specialty: HOSPITALIST Author Type: Physician    Filed: 7/1/2025 10:16 AM Date of Service: 7/1/2025 10:10 AM Status: Signed    : Fausto Malloy DO (Physician)           Hospitalist Discharge Summary    Admission Date/Time  6/25/2025  1:01 PM  Discharge Date  07/01/25    PCP  Juanpablo Bowles MD     Discharging Hospitalist:  Fausto Malloy DO    Disposition:  [facility]    Follow Up Appointments    Primary Hospital Problems/Hospital Course Summary  79-year-old female with history of CAD with bypass, type 2 diabetes, emphysema, GERD, hypertension, hyperlipidemia, OA, polio, which presented to the hospital with left femur fracture s/p mechanical fall.  Underwent open reduction on 6/26, had postoperative urinary retention for which Silverman catheter was placed per urology.  Patient will be going to [facility] at discharge.    Left Femoral fracture  -ortho consulted--> s/p open reduction internal fixation left distal periprosthetic femur fracture 6/26  -prn pain meds, bowel regimen, IS. Silverman in place  -PT/OT--> [facility]  -dopplers neg for LLE DVT. On low dose eliquis vte prophy per ortho     Urine retention- up to 700s on bladder scan  - urology consulted and silverman placed via coude on 6/28 (bedside nursing unable to place)  - outpatient void trial once patient is more mobile      CAD hx  -asa  -atorvastatin   -BB, cards following     HTN  -sbp stable  -lisinopril, metoprolol     HLD  -atorvastatin      Type 2 DM  -hold home meds  -low dose insulin sliding scale     Hypoxia  -likely  secondary to atelectasis  -encourage IS  -titrate o2 as tolerated--> now on RA sometimes, other times needing 2L    Medication Changes  Eliqiuis BID per ortho vs DVT ppx     Important Follow Up Items  Void trial    Procedures/Diagnostics  As above    Change in Code Status: n/a    Discharge Medications       Medication List        START taking these medications      apixaban 2.5 MG Tabs  Commonly known as: Eliquis  Take 1 tablet (2.5 mg total) by mouth 2 (two) times daily.     oxyCODONE 5 MG Tabs  Take 1 tablet (5 mg total) by mouth every 6 (six) hours as needed.            CONTINUE taking these medications      lisinopril 20 MG Tabs  Commonly known as: Prinivil; Zestril     metFORMIN 500 MG Tabs  Commonly known as: Glucophage     metoprolol succinate ER 25 MG Tb24  Commonly known as: Toprol XL     Multi-Vitamin/Minerals Tabs     simvastatin 40 MG Tabs  Commonly known as: Zocor  Take 1 tablet (40 mg total) by mouth daily.            STOP taking these medications      aspirin 81 MG Tbec     diclofenac 75 MG Tbec  Commonly known as: Voltaren     HYDROcodone-acetaminophen 5-325 MG Tabs  Commonly known as: Norco               Where to Get Your Medications        You can get these medications from any pharmacy    Bring a paper prescription for each of these medications  apixaban 2.5 MG Tabs  oxyCODONE 5 MG Tabs       I reconciled current and discharge medications on the day of discharge.    Imaging/Diagnostic Reports  US VENOUS DOPPLER LEG LEFT - DIAG IMG (CPT=93971)  Result Date: 6/29/2025  PROCEDURE: US VENOUS DOPPLER LEG LEFT - DIAG IMG (CPT=93971) INDICATIONS: L calf pain S/P ORIF L periprosthetic femur fracture PATIENT STATED HISTORY: COMPARISON: No comparisons. TECHNIQUE:  Real time, grey scale, and duplex ultrasound was used to evaluate the lower extremity venous system. B-mode two-dimensional images of the vascular structures, Doppler spectral analysis, and color flow.  Doppler imaging were performed.  The  following veins were imaged:  Common, deep, and superficial femoral, popliteal, sapheno-femoral junction, posterior tibial veins, and the contralateral common femoral vein. FINDINGS: SAPHENOFEMORAL JUNCTION: No reflux. THROMBI: None visible. COMPRESSION: Normal compressibility, phasicity, and augmentation. OTHER: Negative.     CONCLUSION: No evidence of DVT in the left lower extremity. Electronically Verified and Signed by Attending Radiologist: Buzz Fields MD 6/29/2025 3:55 PM Workstation: [id number]    XR FLUOROSCOPY C-ARM TIME LESS THAN 1 HOUR (CPT=76000)  Result Date: 6/26/2025  PROCEDURE: XR FLUOROSCOPY C-ARM TIME LESS THAN 1 HOUR (CPT=76000) INDICATIONS: OPEN REDUCTION INTERNAL FIXATION LEFT DISTAL PERIPROSTETIC FEMUR FRACTURE COMPARISON: There are no comparisons for this exam. TECHNIQUE: Intraoperative fluoroscopic films were obtained. Total dose is 6.6038 mGy FINDINGS: Fluoroscopy provided for guidance. No radiologist was present or consulted for the procedure. There are expected intraoperative changes present. Please refer to the operative report for further details. OTHER:Negative.     CONCLUSION: See above. Electronically Verified and Signed by Attending Radiologist: Darius Perry MD 6/26/2025 8:41 PM Workstation: [id number]    XR CHEST AP PORTABLE  (CPT=71045)  Result Date: 6/25/2025  PROCEDURE: XR CHEST AP PORTABLE  (CPT=71045) INDICATIONS: ccollar on patient. lives home alone, poss failure to thrive. fell at home unwittnessed down for 15 mins. deniesm htinners. left knee pain. denies loc. mechanical fall. htn at home. not a good historian. 87-88% on room air, 2l nc.not on o2 at home. ao x4. sonia haile is aware. 18 g COMPARISON: 10/30/2018 chest and 10/22/2018 chest radiograph. FINDINGS: Sternotomy wires and surgical clips are noted consistent with previous CABG. Mildly prominent vascular and interstitial markings are present. No debby alveolar edema. No consolidation. Heart size is normal. Mild  tortuosity of the thoracic aorta with aortic calcification. Mild degenerative changes are seen in the spine. Marked arthritic changes are seen in both shoulders and chronic rotator cuff injuries are seen in both shoulders also which is unchanged.     CONCLUSION: Status post CABG. Mild interstitial and vascular prominence could reflect mild interstitial pulmonary edema or pulmonary scarring. No consolidation or pleural effusion. Electronically Verified and Signed by Attending Radiologist: Isiah Barraza MD 6/25/2025 6:35 PM Workstation: [id number]    XR KNEE (1 OR 2 VIEWS), LEFT (CPT=73560)  Result Date: 6/25/2025  PROCEDURE: XR KNEE (1 OR 2 VIEWS), LEFT (CPT=73560) INDICATIONS: Fall, landed on knee, knee pain COMPARISON: Comparison made to a previous portable left knee radiographs from 9/5/2014. FINDINGS: Bones: Obliquely oriented diaphyseal fracture extending to the metaphysis noted. There is anterior displacement of the distal fracture fragment by 12 mm. The fracture deformity extends to the left femoral arthroplasty prosthesis. There is no subluxation or dislocation. There is mild comminution of the fracture. The patient is status post total left knee arthroplasty. Prosthetic components appear normally located. There is a lipohemarthrosis seen within the knee joint. Soft Tissues: Lipohemarthrosis seen in the knee joint. Extensive vascular calcifications are seen.     CONCLUSION: Acute fracture involving the visualized portion of the mid and distal femoral diaphysis extending to the metaphysis and femoral component of the left knee arthroplasty. There is anterior displacement of the distal fracture fragment and there is mild comminution. There is a lipohemarthrosis seen. Electronically Verified and Signed by Attending Radiologist: Isiah Barraza MD 6/25/2025 3:40 PM Workstation: [id number]    CT BRAIN OR HEAD (CPT=70450)  Result Date: 6/25/2025  PROCEDURE: CT BRAIN OR HEAD (CPT=70450) INDICATIONS: Fall,  struck head COMPARISON: There are no comparisons for this exam. TECHNIQUE: Noncontrast CT scanning is performed through the brain. Automated exposure control techniques for dose reduction were used. Dose information is transmitted to the ACR (American College of Radiology) NRDR (National Radiology Data Registry) which includes the Dose Index Registry. FINDINGS: FINDINGS: There is cerebral atrophy with symmetric prominence of the ventricles. There are patchy areas of low attenuation in the periventricular and deep white matter which are nonspecific but most consistent with small vessel ischemic changes. There is no evidence of hemorrhage, mass, midline shift, or extra-axial fluid collection. The visualized paranasal sinuses show no significant sinus disease. No evidence of depressed skull fracture.     CONCLUSION: 1. No acute intracranial findings 2. Cerebral atrophy with chronic microvascular ischemic changes. Electronically Verified and Signed by Attending Radiologist: Kal Fuller MD 6/25/2025 2:50 PM Workstation: [id number]      Secondary Discharge Diagnoses  As above    Pertinent Physical Exam At Time of Discharge  Vitals:    06/30/25 2000 06/30/25 2330 07/01/25 0400 07/01/25 0805   BP: 157/56 137/58 122/53 131/53   BP Location: Left arm Left arm Left arm Left arm   Pulse: 69 69 65 60   Resp: 18 16 14 16   Temp: 98 °F (36.7 °C) 98.3 °F (36.8 °C) 97.5 °F (36.4 °C) 97.7 °F (36.5 °C)   TempSrc: Oral Oral Oral Oral   SpO2: 93% 97% 93% 93%   Weight:       Height:            General: no acute distress  Heart: RRR  Lungs: CTAB  Abd: Soft, NT, ND  Neuro: no focal deficits    Total time coordinating care 36 mins.    Patient had opportunity to ask questions and stated understanding and agreed with therapeutic plan as outlined.     Fausto Malloy DO  7/1/2025      Electronically signed by Fausto Malloy DO on 7/1/2025 10:16 AM         REVIEWER COMMENTS

## 2025-07-01 NOTE — PAYOR COMM NOTE
--------------  ADMISSION REVIEW   6/25-7/1  Payor: EMMETT BARRERA O  Subscriber #:  [id number]  Authorization Number: [id number]    Admit date: 6/27/25  Admit time:  9:46 AM   Admit Orders (From admission, onward)       Start     Ordered    06/27/25 0947  Admit to inpatient Once  Once        Ordering Provider: Andrew Stinson MD   Question:  Diagnosis  Answer:  Periprosthetic fracture of femur at tip of prosthesis, initial encounter    06/27/25 0946    06/25/25 1831  Place in observation Once  (Place Observation TELE Ortho/Spine)  Once        Ordering Provider: Yonis Macias MD   Question:  Diagnosis  Answer:  Periprosthetic fracture of femur at tip of prosthesis, initial encounter    06/25/25 1830      REVIEW DOCUMENTATION:     ED Provider Notes        ED Provider Notes signed by Yonis Macias MD at 6/25/2025  4:03 PM       Author: Yonis Macias MD Service: -- Author Type: Physician    Filed: 6/25/2025  4:03 PM Date of Service: 6/25/2025  1:17 PM Status: Signed    : Yonis Macias MD (Physician)           Patient Seen in: [facility] Emergency Department    History  Chief Complaint   Patient presents with    Fall     Stated Complaint: ccollar on patient. lives home alone, poss failure to thrive. fell at home unwi*    Subjective:           Patient is an 79-year-old female who presents by ambulance from home for evaluation after a fall.  Patient lives alone in her own home.  She states she was walking with her walker today, she has a grabber to her that she uses to pick things up, she tried to tossed onto the couch and this caused her to lose her balance and fall forward, sort of to the left.  She landed primarily on her left knee and that does feel sore.  She thinks she did hit her head on the ground but just at the very end of the fall.  She states it was not hard, did not lose consciousness and does not have a headache or any other complaints.  She was unable to get up but she has a life alert  button so she hit that to call paramedics.  Her only complaint is left knee pain.      Objective:     Past Medical History:    Aortic atherosclerosis    on LDCT    Collagenous colitis    Coronary atherosclerosis    abn cgxt had cabg 10/18/18, Dr. Martines, Dr. Neves 1/24/20, 9/20    Cyst of left kidney    Diabetes mellitus (HCC)    Emphysema lung (HCC)    no symptoms 4/13/18, sees lung doc prn    Encounter for eye exam    Dr. Gordillo    Encounter for screening for lung cancer    CT with stable nodules, recheck in 10/21 ( she quit tob in 2007)    Exertional angina    Resolved last addressed  10/2/18 Per order 10/2/18    GERD (gastroesophageal reflux disease)    due to hiatal hernia, not bothersome 2013, 4/5/17    Herniated nucleus pulposus, L4-5 left    History of cardiovascular stress test    neg stress echo pre-cardiac rehab    History of normal resting EKG    HTN (hypertension)    Hyperlipidemia LDL goal <70    Hyperplastic colon polyp    x2, no need to repeat    Left-sided low back pain with left-sided sciatica    flare up 7/24/20    Living will in place    Lung nodules    Dr. Cervantes stable on CT 2012, new one 4/29/19, recheck 1 year    Obesity    Osteoarthritis    Other nephritis and nephropathy, not specified as acute or chronic, with specified pathological lesion in kidney    in high school    Personal history of poliomyelitis    no residual    Pneumonia, organism unspecified(486)    Polio (HCC)    no residual    Postmenopausal bleeding    endometrial bx neg with Dr. Menchaca had polyp removal    Screening for breast cancer    refuses mammos in future. no longer needed over age 75    Screening for cardiovascular condition    EF 70%, normal CGXT    Screening for osteoporosis    normal, repeat in 2020, refused 2021    Sebaceous cyst    upper neck, present for years    Shingles    L upper back     Physical Exam    ED Triage Vitals   BP 06/25/25 1330 (!) 170/74   Pulse 06/25/25 1330 109   Resp 06/25/25 1330 20   Temp  06/25/25 1512 97.9 °F (36.6 °C)   Temp src 06/25/25 1512 Oral   SpO2 06/25/25 1330 100 %   O2 Device 06/25/25 1330 None (Room air)       Current Vitals:   Vital Signs  BP: 152/70  Pulse: 106  Resp: 26  Temp: 97.9 °F (36.6 °C)  Temp src: Oral  MAP (mmHg): 94    Oxygen Therapy  SpO2: 95 %  O2 Device: None (Room air)    Physical Exam  Vitals and nursing note reviewed.   Constitutional:       Appearance: She is well-developed.      Comments: Somewhat cachectic-appearing.   HENT:      Head: Normocephalic and atraumatic.   Eyes:      Conjunctiva/sclera: Conjunctivae normal.      Pupils: Pupils are equal, round, and reactive to light.   Neck:      Comments: There is no midline cervical spine tenderness to palpation, step-off, deformity.  C-spine is cleared by Nexus criteria.  Cardiovascular:      Rate and Rhythm: Normal rate and regular rhythm.      Heart sounds: Normal heart sounds.   Pulmonary:      Effort: Pulmonary effort is normal.      Breath sounds: Normal breath sounds.   Abdominal:      General: Bowel sounds are normal.      Palpations: Abdomen is soft.   Musculoskeletal:         General: Normal range of motion.      Cervical back: Normal range of motion and neck supple.      Comments: There is mild diffuse swelling and tenderness to palpation of the left knee with no obvious deformity or dislocation.  Stable on exam.  Right lower extremity looks a little bit rotated externally but she has good range of motion at the hip and there is no tenderness to palpation.   Skin:     General: Skin is warm and dry.   Neurological:      Mental Status: She is alert and oriented to person, place, and time.     ED Course  Labs Reviewed   COMP METABOLIC PANEL (14) - Abnormal; Notable for the following components:       Result Value    Glucose 130 (*)     Calculated Osmolality 301 (*)     All other components within normal limits   CBC WITH DIFFERENTIAL WITH PLATELET - Abnormal; Notable for the following components:    WBC 12.5 (*)      Neutrophil Absolute Prelim 9.97 (*)     Neutrophil Absolute 9.97 (*)     All other components within normal limits   CK CREATINE KINASE (NOT CREATININE) - Normal          XR KNEE (1 OR 2 VIEWS), LEFT (CPT=73560)  Result Date: 6/25/2025  PROCEDURE: XR KNEE (1 OR 2 VIEWS), LEFT (CPT=73560) INDICATIONS: Fall, landed on knee, knee pain COMPARISON: Comparison made to a previous portable left knee radiographs from 9/5/2014. FINDINGS: Bones: Obliquely oriented diaphyseal fracture extending to the metaphysis noted. There is anterior displacement of the distal fracture fragment by 12 mm. The fracture deformity extends to the left femoral arthroplasty prosthesis. There is no subluxation or dislocation. There is mild comminution of the fracture. The patient is status post total left knee arthroplasty. Prosthetic components appear normally located. There is a lipohemarthrosis seen within the knee joint. Soft Tissues: Lipohemarthrosis seen in the knee joint. Extensive vascular calcifications are seen.     CONCLUSION: Acute fracture involving the visualized portion of the mid and distal femoral diaphysis extending to the metaphysis and femoral component of the left knee arthroplasty. There is anterior displacement of the distal fracture fragment and there is mild comminution. There is a lipohemarthrosis seen. Electronically Verified and Signed by Attending Radiologist: Isiah Barraza MD 6/25/2025 3:40 PM Workstation: [id number]    CT BRAIN OR HEAD (CPT=70450)  Result Date: 6/25/2025  PROCEDURE: CT BRAIN OR HEAD (CPT=70450) INDICATIONS: Fall, struck head COMPARISON: There are no comparisons for this exam. TECHNIQUE: Noncontrast CT scanning is performed through the brain. Automated exposure control techniques for dose reduction were used. Dose information is transmitted to the ACR (American College of Radiology) NRDR (National Radiology Data Registry) which includes the Dose Index Registry. FINDINGS: FINDINGS: There is  cerebral atrophy with symmetric prominence of the ventricles. There are patchy areas of low attenuation in the periventricular and deep white matter which are nonspecific but most consistent with small vessel ischemic changes. There is no evidence of hemorrhage, mass, midline shift, or extra-axial fluid collection. The visualized paranasal sinuses show no significant sinus disease. No evidence of depressed skull fracture.     CONCLUSION: 1. No acute intracranial findings 2. Cerebral atrophy with chronic microvascular ischemic changes. Electronically Verified and Signed by Attending Radiologist: Kal Fuller MD 6/25/2025 2:50 PM Workstation: [id number]        [facility]     Patient is an 79-year-old female presenting for evaluation after a fall.  She lives alone at home and as noted above reports she is lost her balance while using her walker and fell.  She states she landed on her left knee and that really is her only complaint.  She does concede that she probably hit her head lightly and in light of that we will send her for CT brain although no headache, loss of consciousness, nausea, mental status changes.  Will check labs to evaluate for dehydration, electrolyte abnormality, rhabdomyolysis.  She states she hit her life alert button call the paramedics so she was not on the ground long but she does appear somewhat disheveled and chronically ill-appearing.  Apparently her son is aware of what happened so have to be in touch with him regarding results as well.    Admission disposition: 6/25/2025  4:03 PM       Update at 3:55 PM.  CT brain is unremarkable, chronic changes but nothing acute.  Unfortunately she does have a periprosthetic distal femur fracture as noted above.  Case will be discussed with orthopedics but with surgical or nonweightbearing she will not be able to go home tonight that she will need to be seen by PT and work through how to get around.  Suspect she will likely need placement at least for  acute/subacute rehab as well.      Past Medical History-hypertension, diabetes, high cholesterol    Differential diagnosis before testing included ICH, skull fracture, knee fracture    Co-morbidities that add to the complexity of management include: None    Testing ordered during this visit included labs, CT brain, knee x-ray    Radiographic images  I personally reviewed the radiographs and my individual interpretation shows no ICH or skull fracture  I also reviewed the official reports that showed no ICH or skull fracture      History obtained by an independent source included from paramedics, family    Discussion of management with hospitalist, orthopedics      Disposition:    Admission  I have discussed with the patient the results of test, differential diagnosis, and treatment plan. They expressed clear understanding of these instructions and agrees to the plan provided.       Disposition and Plan     Clinical Impression:  1. Periprosthetic fracture of femur at tip of prosthesis, initial encounter         Disposition:  Admit  6/25/2025  4:03 pm    Hospital Problems       Present on Admission  Date Reviewed: 3/14/2022          ICD-10-CM Noted POA    * (Principal) Periprosthetic fracture of femur at tip of prosthesis, initial encounter M97.8XXA, Z96.649 6/25/2025 Unknown              6/25 H&P    Chief Complaint: Pain in the left lower extremity     History of Present Illness: Miriam Contreras is a 79 year old female with history of coronary disease with bypass, type 2 diabetes, emphysema, GERD, hypertension, hyperlipidemia, osteoarthritis, history of polio presenting with left lower extremity pain.  Appears that patient had a mechanical fall.  Afterwards she was unable to move her left leg due to pain.  She was able to call her son who eventually called the paramedic.  Patient was brought to emergency room for further evaluation and treatment.  Patient denied any chest pain, palpitations, dizziness, shortness of  breath, focal weaknesses.  Patient denies any other significant positive review of systems.  79 year old female with history of coronary disease with bypass, type 2 diabetes, emphysema, GERD, hypertension, hyperlipidemia, osteoarthritis, history of polio presenting with left lower extremity pain.     Left Femoral fracture  -ortho consulted  -prn pain meds  -npo for surgery tomorrow     Fall  -mechanical  -no further eval needed at this time     CAD hx  -asa  -atorvastatin      HTN  -sbp stable  -lisinopril     HLD  -atorvastatin      Type 2 DM  -hold home meds  -low dose insulin sliding scale     Preop eval  -no active pulm or cardiac complaints  -check cxr  -cardiology consulted  -no further inpt evaluation or treatment needed prior to surgical intervention if ok with cardiology and no acute pathology on cxr            6/26 Ortho    REASON FOR CONSULTATION:  Periprosthetic left femur fracture     CHIEF COMPLAINT:  Left knee pain     HISTORY OF PRESENT ILLNESS:     Miriam Contreras is a a(n) 79 year old female.  Patient fell from standing height at home yesterday.  She reports that she lost balance and fell.  She landed on her left side.  She had pain on the left knee.  ER evaluation showed a periprosthetic femur fracture.  She has history of left total knee replacement more than 10 years ago.  Pain is worse with any attempt of movement.  Denies any new back, hip, ankle pain.  No numbness or tingling sensations.  She is a baseline ambulator using a walker at home.    Placed periprosthetic left distal femur fracture:  Her x-ray findings were discussed with the patient.  Treatment options were reviewed.  Conservative versus surgical interventions were explained.  I recommend open reduction internal fixation.  What this means was explained.  Recovery duration and process were discussed.  Realistic expectation after surgery and recovery were discussed.  Risks of treatment including but not limited to infection,  DVT/pulmonary embolism, failure of fixation, nonunion/malunion, nerve and blood vessel injuries, excessive bleeding, chronic pain, loss of function of the limb, as well as other potential medical complications were discussed.  All questions were encouraged and answered.     Plan for ORIF left distal periprosthetic femur fracture this afternoon.      Temp:  [97.3 °F (36.3 °C)-98.1 °F (36.7 °C)] 98 °F (36.7 °C)  Pulse:  [] 96  Resp:  [13-26] 16  BP: (118-190)/(64-98) 133/64  SpO2:  [92 %-100 %] 94 %      Lab 06/25/25  1323 06/26/25  0506   WBC 12.5* 7.8   HGB 13.3 10.5*   MCV 98.3 98.5   .0 212.0              Recent Labs   Lab 06/25/25  1323 06/26/25  0506    144   K 3.9 3.9    110   CO2 23.0 25.0   BUN 22 24*   CREATSERUM 0.81 0.82   CA 9.3 8.8   MG  --  1.8   * 135*            Recent Labs   Lab 06/25/25  1933 06/25/25  2238 06/26/25  0522 06/26/25  1208   PGLU 120* 152* 135* 121*             6/26/2025  4:47 PM OPEN REDUCTION INTERNAL FIXATION LEFT DISTAL PERIPROSTETIC FEMUR FRACTURE      Report of Surgery     Preoperative diagnosis: Periprosthetic left distal femur fracture  Postoperative diagnosis: Same  Procedure: Open reduction internal fixation left distal periprosthetic femur fracture  Anesthesia: General anesthesia  EBL: 500 mL  Complications: None  Implant: Yanely NCB 12 hole plate            6/27     Temp:  [98 °F (36.7 °C)-98.8 °F (37.1 °C)] 98 °F (36.7 °C)  Pulse:  [] 85  Resp:  [14-27] 18  BP: ()/() 92/71  SpO2:  [90 %-98 %] 90 %  Exam  Gen: No acute distress, alert and oriented  Pulm: Lungs clear bilaterally, normal respiratory effort, no crackles, no wheezing  CV: Heart with regular rate and rhythm, no murmur.   Abd: Abdomen soft, nontender, nondistended, bowel sounds present  MSK: no significant pitting edema or tenderness of the LE  Skin: no new rashes or lesions     Labs:         Recent Labs   Lab 06/25/25  1323 06/26/25  0506 06/27/25  0441   WBC  12.5* 7.8 8.4   HGB 13.3 10.5* 9.5*   MCV 98.3 98.5 101.7*   .0 212.0 192.0               Recent Labs   Lab 06/25/25  1323 06/26/25  0506 06/27/25  0441    144 143   K 3.9 3.9 3.9    110 109   CO2 23.0 25.0 23.0   BUN 22 24* 23   CREATSERUM 0.81 0.82 0.79   CA 9.3 8.8 8.6*   MG  --  1.8 1.8   * 135* 142*              Recent Labs   Lab 06/26/25  0522 06/26/25  1208 06/26/25  1848 06/26/25  2125 06/27/25  0520   PGLU 135* 121* 152* 162* 143*        ceFAZolin  2 g Intravenous Q8H     Left Femoral fracture  -ortho consulted--> POD # 1 s/p open reduction internal fixation left distal periprosthetic femur fracture.   -prn pain meds  -PT/OT     Fall  -mechanical  -no further eval needed at this time     CAD hx  -asa  -atorvastatin      HTN  -sbp stable  -lisinopril--> hold for low BP     HLD  -atorvastatin      Type 2 DM  -hold home meds  -low dose insulin sliding scale     Hypoxia  -likely secondary to atelectasis  -encourage IS  -titrate o2 as tolerated        6/27 Cardiology  Fall--->Left femur fx-->Open reduction internal fixation left distal periprosthetic femur fracture , 6/26/25   2. CAD, s/p 2 vessel CABG (RCA, LAD), 2018. Asymptomatic.. on asa, statin  3. Hypertension .   4. Diabetes  5. Dyslipidemia      Plan:     1.  Monitor cardiac rhythm through manisha-op period  2. Will reduce  metoprolol to 25 mg bid  3. Continue aspirin          6/27 Ortho  Subjective:     POD #1 s/p ORIF left distal periprosthetic femur fracture    S/p ORIF left distal periprosthetic femur fracture     Today's plan discussed.  PT/OT  NWB LLE  Immobilizer on when up and moving.  DVT prophylaxis with  Eliquis 2.5mg BID x 6 weeks  Anticipate DC to ANA.  Oxycodone 5mg prescription printed and on physical chart for transfer.          6/28   Feels constipated, not passing flatus.      OBJECTIVE:  Temp:  [98 °F (36.7 °C)-98.5 °F (36.9 °C)] 98 °F (36.7 °C)  Pulse:  [78-86] 86  Resp:  [18] 18  BP: ()/(33-50)  133/50  SpO2:  [90 %-98 %] 94 %    Lab 06/25/25  1323 06/26/25  0506 06/27/25  0441 06/28/25  0514   WBC 12.5* 7.8 8.4 8.3   HGB 13.3 10.5* 9.5* 8.3*   MCV 98.3 98.5 101.7* 98.8   .0 212.0 192.0 172.0                Recent Labs   Lab 06/25/25  1323 06/26/25  0506 06/27/25  0441 06/28/25  0514    144 143 142   K 3.9 3.9 3.9 3.8    110 109 109   CO2 23.0 25.0 23.0 23.0   BUN 22 24* 23 29*   CREATSERUM 0.81 0.82 0.79 0.64   CA 9.3 8.8 8.6* 8.2*   MG  --  1.8 1.8 1.8   * 135* 142* 164*   79 year old female with history of coronary disease with bypass, type 2 diabetes, emphysema, GERD, hypertension, hyperlipidemia, osteoarthritis, history of polio presenting with left lower extremity pain.     Left Femoral fracture  -ortho consulted--> s/p open reduction internal fixation left distal periprosthetic femur fracture 6/26  -prn pain meds, bowel regimen, IS. Monitor for urine retention  -PT/OT--> possible ANA     Fall  -mechanical  -no further eval needed at this time     CAD hx  -asa  -atorvastatin   -BB, cards following     HTN  -sbp stable  -lisinopril--> hold for low BP  -on metop 25 bid at reduced dose     HLD  -atorvastatin      Type 2 DM  -hold home meds  -low dose insulin sliding scale     Hypoxia  -likely secondary to atelectasis  -encourage IS  -titrate o2 as tolerated--> now on RA sometimes, other times needing 2L        6/28 Cardiology  Fall--->Left femur fx-->Open reduction internal fixation left distal periprosthetic femur fracture , 6/26/25   2. CAD, s/p 2 vessel CABG (RCA, LAD), 2018. Asymptomatic.. on asa, statin  3. Hypertension .   4. Diabetes  5. Dyslipidemia      Plan:     1.  Monitor cardiac rhythm through manisha-op period  2. Continue metoprolol to 25 mg bid  3. Continue aspirin        6/28 Urology    Reason for Consultation:  Incomplete bladder emptying, inability to place silverman     History of Present Illness:  Miriam Contreras is a a(n) 79 year old female with minimal baseline  voiding complaints admitted after a fall and is s/p ORIF L periprosthetic femur fracture. POD #2. She has been voiding but her PVRs have been elevated. Multiple attempts were made to straight cath and place a silverman by the RN staff unsuccessfully and Urology was consulted. She was constipated but had a large BM this am after a suppository. No constipation prior to admission.      IMPRESSION     1.           Incomplete bladder emptying with inability to place silverman catheter  -6/28/25 A 14 Fr coude catheter was placed with minimal difficulty     PLAN     1.           Leave silverman until the patient is more ambulatory and then void tria          6/29 Ortho    Assessment/Plan:  S/P ORIF L periprosthetic femur fracture. POD #3  Venous doppler ordered to R/O DVT  NWB R LE          6/29    SUBJECTIVE: urine retention noted yesterday and difficulty placing catheter. Urology consulted. Pt reports feeling okay today. Continues with left leg pain     OBJECTIVE:  Temp:  [97.7 °F (36.5 °C)-99.2 °F (37.3 °C)] 99.2 °F (37.3 °C)  Pulse:  [74-82] 78  Resp:  [18-20] 18  BP: (122-160)/(46-75) 160/75  SpO2:  [89 %-100 %] 100 %      Abd: Abdomen mildly distended, nontender       Lab 06/25/25  1323 06/26/25  0506 06/27/25  0441 06/28/25  0514 06/29/25  0446   WBC 12.5* 7.8 8.4 8.3 7.8   HGB 13.3 10.5* 9.5* 8.3* 8.0*   MCV 98.3 98.5 101.7* 98.8 99.6   .0 212.0 192.0 172.0 190.0                 Recent Labs   Lab 06/25/25  1323 06/26/25  0506 06/27/25  0441 06/28/25  0514 06/29/25  0446    144 143 142 141   K 3.9 3.9 3.9 3.8 4.0    110 109 109 109   CO2 23.0 25.0 23.0 23.0 24.0   BUN 22 24* 23 29* 20   CREATSERUM 0.81 0.82 0.79 0.64 0.55   CA 9.3 8.8 8.6* 8.2* 8.2*   MG  --  1.8 1.8 1.8 2.1   * 135* 142* 164* 144*   79 year old female with history of coronary disease with bypass, type 2 diabetes, emphysema, GERD, hypertension, hyperlipidemia, osteoarthritis, history of polio presenting with left lower extremity  pain.     Left Femoral fracture  -ortho consulted--> s/p open reduction internal fixation left distal periprosthetic femur fracture 6/26  -prn pain meds, bowel regimen, IS. Silverman in place  -PT/OT--> possible ANA  -dopplers ordered per ortho. On low dose eliquis vte prophy per ortho     Fall  -mechanical  -no further eval needed at this time     Urine retention- up to 700s on bladder scan  - urology consulted and silverman placed via coude on 6/28 (bedside nursing unable to place)     CAD hx  -asa  -atorvastatin   -BB, cards following     HTN  -sbp stable  -lisinopril--> hold for low BP  -on metop 25 bid at reduced dose     HLD  -atorvastatin      Type 2 DM  -hold home meds  -low dose insulin sliding scale     Hypoxia  -likely secondary to atelectasis  -encourage IS  -titrate o2 as tolerated--> now on RA sometimes, other times needing 2L        6/30    SUBJECTIVE: today reporting discomfort in R buttock which is new but occurred towards end of PT session.     OBJECTIVE:  Temp:  [97.4 °F (36.3 °C)-98.7 °F (37.1 °C)] 97.8 °F (36.6 °C)  Pulse:  [61-70] 62  Resp:  [16-20] 18  BP: (106-142)/(47-71) 136/52  SpO2:  [92 %-100 %] 95 %      Abd: Abdomen mildly distended, nontender       Lab 06/26/25  0506 06/27/25  0441 06/28/25  0514 06/29/25  0446 06/30/25  0415   WBC 7.8 8.4 8.3 7.8 6.1   HGB 10.5* 9.5* 8.3* 8.0* 7.7*   MCV 98.5 101.7* 98.8 99.6 99.1   .0 192.0 172.0 190.0 208.0                 Recent Labs   Lab 06/26/25  0506 06/27/25  0441 06/28/25  0514 06/29/25  0446 06/30/25  0415    143 142 141 141   K 3.9 3.9 3.8 4.0 3.9    109 109 109 109   CO2 25.0 23.0 23.0 24.0 24.0   BUN 24* 23 29* 20 18   CREATSERUM 0.82 0.79 0.64 0.55 0.50*   CA 8.8 8.6* 8.2* 8.2* 8.2*   MG 1.8 1.8 1.8 2.1  --    * 142* 164* 144* 128*     Left Femoral fracture  -ortho consulted--> s/p open reduction internal fixation left distal periprosthetic femur fracture 6/26  -prn pain meds, bowel regimen, IS. Silverman in  place  -PT/OT--> possible ANA  -dopplers neg for LLE DVT. On low dose eliquis vte prophy per ortho     Fall  -mechanical  -no further eval needed at this time     Urine retention- up to 700s on bladder scan  - urology consulted and silverman placed via coude on 6/28 (bedside nursing unable to place)     CAD hx  -asa  -atorvastatin   -BB, cards following     HTN  -sbp stable  -lisinopril--> hold for low BP  -on metop 25 bid at reduced dose     HLD  -atorvastatin      Type 2 DM  -hold home meds  -low dose insulin sliding scale     Hypoxia  -likely secondary to atelectasis  -encourage IS  -titrate o2 as tolerated--> now on RA sometimes, other times needing 2L               Medications 06/25/25 06/26/25 06/27/25 06/28/25 06/29/25 06/30/25 07/01/25   acetaminophen (Tylenol Extra Strength) tab 1,000 mg  Dose: 1,000 mg  Freq: Every 8 hours scheduled Route: OR  Start: 06/26/25 2200     2145 EM-Given      0541 EM-Given     1320 JH-Given     2134 BG-Given      0523 BG-Given     1248 JA-Given     2146 BG-Given      0543 BG-Given     1611 JH-Given     2048 JS-Given      0603 JS-Given     (1400 AB)-Not Given     2114 LR-Given      0605 LR-Given     1400     2200        acetaminophen (Tylenol Extra Strength) tab 1,000 mg  Dose: 1,000 mg  Freq: Once Route: OR  Start: 06/25/25 1352 End: 06/25/25 1402    1402 EW-Given                             magnesium oxide (Mag-Ox) tab 400 mg  Dose: 400 mg  Freq: Once Route: OR  Start: 06/28/25 0815 End: 06/28/25 0900       0900 JA-Given           magnesium oxide (Mag-Ox) tab 400 mg  Dose: 400 mg  Freq: Once Route: OR  Start: 06/27/25 0800 End: 06/27/25 0823      0823 JH-Given                morphINE PF 4 MG/ML injection 4 mg  Dose: 4 mg  Freq: Once Route: IV  Start: 06/25/25 1601 End: 06/25/25 1607    1607 EW-Given                            ondansetron (Zofran) 4 MG/2ML injection 4 mg  Dose: 4 mg  Freq: Once Route: IV  Start: 06/25/25 1601 End: 06/25/25 1607    1607 EW-Given               potassium chloride (Klor-Con M20) tab 40 mEq  Dose: 40 mEq  Freq: Once Route: OR  Start: 06/28/25 0815 End: 06/28/25 0900   Admin Instructions:   Do not crush       0900 JA-Given                          lactated ringers infusion  Rate: 100 mL/hr  Freq: Continuous Route: IV  Last Dose: Stopped (06/26/25 2100)  Start: 06/26/25 1900 End: 06/26/25 2107     1841 PT-Rate/Dose Change [C]     1931 PT-New Bag     2100 EM-Stopped     2107-D/C'd           sodium chloride 0.9% infusion  Rate: 83 mL/hr  Freq: Continuous Route: IV  Start: 06/27/25 1300 End: 06/28/25 1733      1300 JH-New Bag     2028 BG-New Bag      0531 BG-New Bag     1733-D/C'd             Or   HYDROmorphone (Dilaudid) 1 MG/ML injection 0.4 mg  Dose: 0.4 mg  Freq: Every 2 hour PRN Route: IV  PRN Reason: severe pain  Start: 06/26/25 2108 End: 06/30/25 1632   Admin Instructions:   Use PRN reason as a guide and follow range order policy. If oral pain meds are ordered and patient can tolerate oral intake, start with PRN oral pain medications first.         1120 AB-Given     1632-D/C'd                     Or   morphINE PF 2 MG/ML injection 2 mg  Dose: 2 mg  Freq: Every 2 hour PRN Route: IV  PRN Reason: moderate pain  Start: 06/25/25 1830   Admin Instructions:   Use PRN reason as a guide and follow range order policy. If oral pain meds are ordered and patient can tolerate oral intake, start with PRN oral pain medications first.          0206 EM-Given               1525 UE-MAR Hold             Or   morphINE PF 4 MG/ML injection 4 mg  Dose: 4 mg  Freq: Every 2 hour PRN Route: IV  PRN Reason: severe pain  Start: 06/25/25 1830   Admin Instructions:   Use PRN reason as a guide and follow range order policy. If oral pain meds are ordered and patient can tolerate oral intake, start with PRN oral pain medications first.    1837 NW-Given           0837 BT-Given     1132 BT-Given     1525 UE-MAR Hold                 ondansetron (Zofran) 4 MG/2ML injection 4 mg  Dose: 4  mg  Freq: Every 6 hours PRN Route: IV  PRN Reasons: Nausea,vomiting  Start: 06/25/25 1830   Admin Instructions:   Default antiemetic sequence (unless otherwise preferred by patient):  1. ondansetron (Zofran) 2. metoclopramide (Reglan)  Wait 15 minutes before proceeding to next medication in sequence.  Follow therapeutic duplication policy.     1525 UE-MAR Hold     1820 AYALA-Given              06/30/25 2330 98.3 °F (36.8 °C) 69 16 137/58 97 % -- Nasal cannula 1 L/min RR   06/30/25 2000 98 °F (36.7 °C) 69 18 157/56 93 % -- Nasal cannula 1 L/min RR   06/30/25 1656 97.7 °F (36.5 °C) 83 18 134/54 90 % -- Nasal cannula -- EF   06/30/25 1157 97.7 °F (36.5 °C) 73 18 109/39 91 % -- Nasal cannula -- EF   06/30/25 0754 97.8 °F (36.6 °C) 62 18 136/52 95 % -- Nasal cannula 1 L/min MA   06/30/25 0656 -- 61 -- -- 94 % 165 lb 4.8 oz (75 kg) -- -- JS   06/30/25 0545 -- 65 -- -- 92 % -- -- -- AT   06/30/25 0400 98.7 °F (37.1 °C) 61 18 130/47 93 % -- Nasal cannula 1 L/min AT   06/30/25 0000 98.2 °F (36.8 °C) 66 18 123/56 96 % -- Nasal cannula 2 L/min AT   06/29/25 2000 98.2 °F (36.8 °C) 70 18 123/52 94 % -- Nasal cannula 2 L/min AT   06/29/25 1707 -- -- -- 138/63 -- -- -- -- JH   06/29/25 1600 97.8 °F (36.6 °C) 70 16 106/71 98 % -- Nasal cannula 2 L/min OP   06/29/25 1200 97.4 °F (36.3 °C) 70 20 142/64 100 % -- -- -- OP   06/29/25 0900 -- -- -- -- 100 % -- Nasal cannula 2 L/min SB   06/29/25 0801 99.2 °F (37.3 °C) -- 18 -- -- -- -- 2 L/min JH   06/29/25 0544 -- -- -- 160/75 -- -- -- -- BG   06/29/25 0313 97.9 °F (36.6 °C) 78 18 142/65 89 % Abnormal  -- Nasal cannula 2 L/min    06/28/25 2331 97.9 °F (36.6 °C) 79 20 -- -- -- Nasal cannula 2 L/min    06/28/25 2331 -- -- -- 148/55 93 % -- -- -- KE   06/28/25 1927 97.8 °F (36.6 °C) 74 18 122/46 93 % -- Nasal cannula 2 L/min AH   06/28/25 1631 97.7 °F (36.5 °C) 82 18 159/55 91 % -- Nasal cannula 2 L/min CA   06/28/25 1200 98.1 °F (36.7 °C) 79 18 128/57 94 % -- None (Room air) 0 L/min MM    06/28/25 0730 98.2 °F (36.8 °C) 71 18 133/59 97 % -- None (Room air) 0 L/min MM   06/28/25 0400 98 °F (36.7 °C) 86 18 133/50 94 % -- None (Room air) 0 L/min CM   06/27/25 2300 98.3 °F (36.8 °C) 78 18 105/37 98 % -- None (Room air) 0 L/min CM   06/27/25 1920 98 °F (36.7 °C) 84 18 100/33 90 % -- None (Room air) 0 L/min CM   06/27/25 1600 98.5 °F (36.9 °C) 79 18 95/46 95 % -- None (Room air) 0 L/min MM   06/27/25 1230 98 °F (36.7 °C) 83 18 93/38 96 % -- None (Room air) 0 L/min MM   06/27/25 0800 97.8 °F (36.6 °C) 70 18 96/46 88 % Abnormal  -- None (Room air) 0 L/min MM   06/27/25 0400 98 °F (36.7 °C) 85 18 92/71 90 % -- None (Room air) 0 L/min CM   06/26/25 2302 98.1 °F (36.7 °C) 83 18 114/47 96 % -- Nasal cannula 3 L/min CM   06/26/25 2155 -- -- -- -- 93 % -- Nasal cannula 3 L/min TK   06/26/25 2120 98.5 °F (36.9 °C) 113 18 119/46 92 % -- Nasal cannula 3 L/min CM   06/26/25 2055 -- -- -- -- 97 % -- Nasal cannula 3 L/min PT   06/26/25 2050 -- 113 18 -- 97 % -- -- -- PT   06/26/25 2045 -- 113 18 -- 98 % -- -- -- PT   06/26/25 2040 -- 116 18 -- 98 % -- -- -- PT   06/26/25 2035 -- 115 19 114/69 96 % -- -- -- PT   06/26/25 2030 -- 114 20 -- 97 % -- -- -- PT   06/26/25 2025 -- 116 18 -- 98 % -- -- -- PT   06/26/25 2020 -- 116 18 110/66 97 % -- -- -- PT   06/26/25 2015 -- 114 14 -- 96 % -- -- -- PT   06/26/25 2010 -- 116 20 -- 96 % -- -- -- PT   06/26/25 2005 -- 115 18 114/66 96 % -- -- -- PT   06/26/25 2000 -- 117 18 -- 95 % -- -- -- PT   06/26/25 1955 98.8 °F (37.1 °C) 118 16 -- 95 % -- -- -- PT   06/26/25 1950 -- 117 19 139/62 96 % -- -- -- PT   06/26/25 1945 -- 115 18 -- 95 % -- -- -- PT   06/26/25 1940 -- 114 19 -- 97 % -- -- -- PT   06/26/25 1935 -- 114 17 122/62 96 % -- -- -- PT   06/26/25 1930 -- 114 21 -- 94 % -- -- -- PT   06/26/25 1925 -- 111 18 -- 95 % -- -- -- PT   06/26/25 1920 -- 116 23 119/51 94 % -- -- -- PT   06/26/25 1915 -- 111 20 -- 95 % -- -- -- PT   06/26/25 1910 -- 110 22 -- 94 % -- -- -- PT    06/26/25 1905 -- 111 22 114/55 93 % -- Nasal cannula 3 L/min PT   06/26/25 1900 -- 120 27 Abnormal  125/56 92 % -- None (Room air) -- PT   06/26/25 1855 -- 117 23 117/100 Abnormal  94 % -- -- -- PT   06/26/25 1850 -- 115 21 134/60 96 % -- -- -- PT   06/26/25 1841 -- -- -- -- -- -- Simple mask 6 L/min PT   06/26/25 1841 98.3 °F (36.8 °C) 116 16 126/78 96 % -- -- -- AYALA   06/26/25 1200 98 °F (36.7 °C) 96 16 133/64 94 % -- Nasal cannula 4 L/min    06/26/25 0800 98.1 °F (36.7 °C) 104 16 124/66 95 % -- Nasal cannula 4 L/min    06/26/25 0400 97.9 °F (36.6 °C) 94 18 134/78 95 % -- Nasal cannula 2 L/min    06/26/25 0300 -- 103 -- -- 94 % -- -- --    06/26/25 0000 97.8 °F (36.6 °C) 94 18 -- 93 % -- Nasal cannula 2 L/min    06/25/25 2000 97.9 °F (36.6 °C) 110 18 153/94 Abnormal  92 % -- Nasal cannula 2 L/min    06/25/25 1838 97.3 °F (36.3 °C) 105 20 154/72 92 % -- Nasal cannula 3 L/min NW   06/25/25 1800 -- 104 23 -- 98 % -- Nasal cannula 4 L/min    06/25/25 1730 -- 102 22 157/74 96 % -- Nasal cannula 2 L/min    06/25/25 1700 -- 104 15 142/74 95 % -- Nasal cannula 2 L/min    06/25/25 1630 -- 84 19 118/98 Abnormal  95 % -- None (Room air) -- EW   06/25/25 1600 -- 105 25 137/76 95 % -- None (Room air) -- EW   06/25/25 1543 -- -- -- -- -- -- None (Room air) -- EW 06/25/25 1530 -- 106 26 152/70 95 % -- None (Room air) -- EW 06/25/25 1512 97.9 °F (36.6 °C) -- -- -- -- -- -- -- EW 06/25/25 1500 -- 99 17 167/71 Abnormal  95 % -- None (Room air) -- EW 06/25/25 1430 -- 103 20 -- 95 % -- None (Room air) -- EW 06/25/25 1400 -- 102 13 190/98 Abnormal  95 % -- None (Room air) -- EW 06/25/25 1330 -- 109 20 170/74 Abnormal  100 % 150 lb (68 kg) None (Room air) -- EW           --------------  DISCHARGE REVIEW    Payor: [employer] [facility]  Subscriber #:  [id number]  Authorization Number: [id number]    Admit date: 6/27/25  Admit time:   9:46 AM  Discharge Date: 7/1/2025 11:48 AM     Admitting Physician: Andrew Stinson  MD  Attending Physician:  No att. providers found  Primary Care Physician: Juanpablo Bowles MD          Discharge Summary Notes        Discharge Summary signed by Fausto Malloy DO at 7/1/2025 10:16 AM       Author: Fausto Malloy DO Specialty: HOSPITALIST Author Type: Physician    Filed: 7/1/2025 10:16 AM Date of Service: 7/1/2025 10:10 AM Status: Signed    : Fausto Malloy DO (Physician)           Hospitalist Discharge Summary    Admission Date/Time  6/25/2025  1:01 PM  Discharge Date  07/01/25    PCP  Juanpablo Bowles MD     Discharging Hospitalist:  Fausto Malloy DO    Disposition:  [facility]    Follow Up Appointments    Primary Hospital Problems/Hospital Course Summary  79-year-old female with history of CAD with bypass, type 2 diabetes, emphysema, GERD, hypertension, hyperlipidemia, OA, polio, which presented to the hospital with left femur fracture s/p mechanical fall.  Underwent open reduction on 6/26, had postoperative urinary retention for which Silverman catheter was placed per urology.  Patient will be going to [facility] at discharge.    Left Femoral fracture  -ortho consulted--> s/p open reduction internal fixation left distal periprosthetic femur fracture 6/26  -prn pain meds, bowel regimen, IS. Silverman in place  -PT/OT--> [facility]  -dopplers neg for LLE DVT. On low dose eliquis vte prophy per ortho     Urine retention- up to 700s on bladder scan  - urology consulted and silverman placed via coude on 6/28 (bedside nursing unable to place)  - outpatient void trial once patient is more mobile      CAD hx  -asa  -atorvastatin   -BB, cards following     HTN  -sbp stable  -lisinopril, metoprolol     HLD  -atorvastatin      Type 2 DM  -hold home meds  -low dose insulin sliding scale     Hypoxia  -likely secondary to atelectasis  -encourage IS  -titrate o2 as tolerated--> now on RA sometimes, other times needing 2L    Medication Changes  Eliqiuis BID per ortho vs DVT ppx     Important Follow Up Items  Void trial    Procedures/Diagnostics  As  above    Change in Code Status: n/a    Discharge Medications       Medication List        START taking these medications      apixaban 2.5 MG Tabs  Commonly known as: Eliquis  Take 1 tablet (2.5 mg total) by mouth 2 (two) times daily.     oxyCODONE 5 MG Tabs  Take 1 tablet (5 mg total) by mouth every 6 (six) hours as needed.            CONTINUE taking these medications      lisinopril 20 MG Tabs  Commonly known as: Prinivil; Zestril     metFORMIN 500 MG Tabs  Commonly known as: Glucophage     metoprolol succinate ER 25 MG Tb24  Commonly known as: Toprol XL     Multi-Vitamin/Minerals Tabs     simvastatin 40 MG Tabs  Commonly known as: Zocor  Take 1 tablet (40 mg total) by mouth daily.            STOP taking these medications      aspirin 81 MG Tbec     diclofenac 75 MG Tbec  Commonly known as: Voltaren     HYDROcodone-acetaminophen 5-325 MG Tabs  Commonly known as: Norco               Where to Get Your Medications        You can get these medications from any pharmacy    Bring a paper prescription for each of these medications  apixaban 2.5 MG Tabs  oxyCODONE 5 MG Tabs       I reconciled current and discharge medications on the day of discharge.    Imaging/Diagnostic Reports  US VENOUS DOPPLER LEG LEFT - DIAG IMG (CPT=93971)  Result Date: 6/29/2025  PROCEDURE: US VENOUS DOPPLER LEG LEFT - DIAG IMG (CPT=93971) INDICATIONS: L calf pain S/P ORIF L periprosthetic femur fracture PATIENT STATED HISTORY: COMPARISON: No comparisons. TECHNIQUE:  Real time, grey scale, and duplex ultrasound was used to evaluate the lower extremity venous system. B-mode two-dimensional images of the vascular structures, Doppler spectral analysis, and color flow.  Doppler imaging were performed.  The following veins were imaged:  Common, deep, and superficial femoral, popliteal, sapheno-femoral junction, posterior tibial veins, and the contralateral common femoral vein. FINDINGS: SAPHENOFEMORAL JUNCTION: No reflux. THROMBI: None visible.  COMPRESSION: Normal compressibility, phasicity, and augmentation. OTHER: Negative.     CONCLUSION: No evidence of DVT in the left lower extremity. Electronically Verified and Signed by Attending Radiologist: Buzz Fields MD 6/29/2025 3:55 PM Workstation: [id number]    XR FLUOROSCOPY C-ARM TIME LESS THAN 1 HOUR (CPT=76000)  Result Date: 6/26/2025  PROCEDURE: XR FLUOROSCOPY C-ARM TIME LESS THAN 1 HOUR (CPT=76000) INDICATIONS: OPEN REDUCTION INTERNAL FIXATION LEFT DISTAL PERIPROSTETIC FEMUR FRACTURE COMPARISON: There are no comparisons for this exam. TECHNIQUE: Intraoperative fluoroscopic films were obtained. Total dose is 6.6038 mGy FINDINGS: Fluoroscopy provided for guidance. No radiologist was present or consulted for the procedure. There are expected intraoperative changes present. Please refer to the operative report for further details. OTHER:Negative.     CONCLUSION: See above. Electronically Verified and Signed by Attending Radiologist: Darius Perry MD 6/26/2025 8:41 PM Workstation: [id number]    XR CHEST AP PORTABLE  (CPT=71045)  Result Date: 6/25/2025  PROCEDURE: XR CHEST AP PORTABLE  (CPT=71045) INDICATIONS: ccollar on patient. lives home alone, poss failure to thrive. fell at home unwittnessed down for 15 mins. deniesm htinners. left knee pain. denies loc. mechanical fall. htn at home. not a good historian. 87-88% on room air, 2l nc.not on o2 at home. ao x4. sonia haile is aware. 18 g COMPARISON: 10/30/2018 chest and 10/22/2018 chest radiograph. FINDINGS: Sternotomy wires and surgical clips are noted consistent with previous CABG. Mildly prominent vascular and interstitial markings are present. No debby alveolar edema. No consolidation. Heart size is normal. Mild tortuosity of the thoracic aorta with aortic calcification. Mild degenerative changes are seen in the spine. Marked arthritic changes are seen in both shoulders and chronic rotator cuff injuries are seen in both shoulders also which is unchanged.      CONCLUSION: Status post CABG. Mild interstitial and vascular prominence could reflect mild interstitial pulmonary edema or pulmonary scarring. No consolidation or pleural effusion. Electronically Verified and Signed by Attending Radiologist: Isiah Barraza MD 6/25/2025 6:35 PM Workstation: [id number]    XR KNEE (1 OR 2 VIEWS), LEFT (CPT=73560)  Result Date: 6/25/2025  PROCEDURE: XR KNEE (1 OR 2 VIEWS), LEFT (CPT=73560) INDICATIONS: Fall, landed on knee, knee pain COMPARISON: Comparison made to a previous portable left knee radiographs from 9/5/2014. FINDINGS: Bones: Obliquely oriented diaphyseal fracture extending to the metaphysis noted. There is anterior displacement of the distal fracture fragment by 12 mm. The fracture deformity extends to the left femoral arthroplasty prosthesis. There is no subluxation or dislocation. There is mild comminution of the fracture. The patient is status post total left knee arthroplasty. Prosthetic components appear normally located. There is a lipohemarthrosis seen within the knee joint. Soft Tissues: Lipohemarthrosis seen in the knee joint. Extensive vascular calcifications are seen.     CONCLUSION: Acute fracture involving the visualized portion of the mid and distal femoral diaphysis extending to the metaphysis and femoral component of the left knee arthroplasty. There is anterior displacement of the distal fracture fragment and there is mild comminution. There is a lipohemarthrosis seen. Electronically Verified and Signed by Attending Radiologist: Isiah Barraza MD 6/25/2025 3:40 PM Workstation: [id number]    CT BRAIN OR HEAD (CPT=70450)  Result Date: 6/25/2025  PROCEDURE: CT BRAIN OR HEAD (CPT=70450) INDICATIONS: Fall, struck head COMPARISON: There are no comparisons for this exam. TECHNIQUE: Noncontrast CT scanning is performed through the brain. Automated exposure control techniques for dose reduction were used. Dose information is transmitted to the ACR (American  College of Radiology) NRDR (National Radiology Data Registry) which includes the Dose Index Registry. FINDINGS: FINDINGS: There is cerebral atrophy with symmetric prominence of the ventricles. There are patchy areas of low attenuation in the periventricular and deep white matter which are nonspecific but most consistent with small vessel ischemic changes. There is no evidence of hemorrhage, mass, midline shift, or extra-axial fluid collection. The visualized paranasal sinuses show no significant sinus disease. No evidence of depressed skull fracture.     CONCLUSION: 1. No acute intracranial findings 2. Cerebral atrophy with chronic microvascular ischemic changes. Electronically Verified and Signed by Attending Radiologist: Kal Fuller MD 6/25/2025 2:50 PM Workstation: [id number]      Secondary Discharge Diagnoses  As above    Pertinent Physical Exam At Time of Discharge  Vitals:    06/30/25 2000 06/30/25 2330 07/01/25 0400 07/01/25 0805   BP: 157/56 137/58 122/53 131/53   BP Location: Left arm Left arm Left arm Left arm   Pulse: 69 69 65 60   Resp: 18 16 14 16   Temp: 98 °F (36.7 °C) 98.3 °F (36.8 °C) 97.5 °F (36.4 °C) 97.7 °F (36.5 °C)   TempSrc: Oral Oral Oral Oral   SpO2: 93% 97% 93% 93%   Weight:       Height:            General: no acute distress  Heart: RRR  Lungs: CTAB  Abd: Soft, NT, ND  Neuro: no focal deficits    Total time coordinating care 36 mins.    Patient had opportunity to ask questions and stated understanding and agreed with therapeutic plan as outlined.     Fausto Malloy DO  7/1/2025      Electronically signed by Fausto Malloy DO on 7/1/2025 10:16 AM         REVIEWER COMMENTS

## 2025-07-01 NOTE — DISCHARGE SUMMARY
Hospitalist Discharge Summary    Admission Date/Time  6/25/2025  1:01 PM  Discharge Date  07/01/25    PCP  Juanpablo Bowles MD     Discharging Hospitalist:  Fausto Malloy DO    Disposition:  [facility]    Follow Up Appointments    Primary Hospital Problems/Hospital Course Summary  79-year-old female with history of CAD with bypass, type 2 diabetes, emphysema, GERD, hypertension, hyperlipidemia, OA, polio, which presented to the hospital with left femur fracture s/p mechanical fall.  Underwent open reduction on 6/26, had postoperative urinary retention for which Silverman catheter was placed per urology.  Patient will be going to [facility] at discharge.    Left Femoral fracture  -ortho consulted--> s/p open reduction internal fixation left distal periprosthetic femur fracture 6/26  -prn pain meds, bowel regimen, IS. Silverman in place  -PT/OT--> [facility]  -dopplers neg for LLE DVT. On low dose eliquis vte prophy per ortho     Urine retention- up to 700s on bladder scan  - urology consulted and silverman placed via coude on 6/28 (bedside nursing unable to place)  - outpatient void trial once patient is more mobile      CAD hx  -asa  -atorvastatin   -BB, cards following     HTN  -sbp stable  -lisinopril, metoprolol     HLD  -atorvastatin      Type 2 DM  -hold home meds  -low dose insulin sliding scale     Hypoxia  -likely secondary to atelectasis  -encourage IS  -titrate o2 as tolerated--> now on RA sometimes, other times needing 2L    Medication Changes  Eliqiuis BID per ortho vs DVT ppx     Important Follow Up Items  Void trial    Procedures/Diagnostics  As above    Change in Code Status: n/a    Discharge Medications       Medication List        START taking these medications      apixaban 2.5 MG Tabs  Commonly known as: Eliquis  Take 1 tablet (2.5 mg total) by mouth 2 (two) times daily.     oxyCODONE 5 MG Tabs  Take 1 tablet (5 mg total) by mouth every 6 (six) hours as needed.            CONTINUE taking these medications      lisinopril 20 MG  Tabs  Commonly known as: Prinivil; Zestril     metFORMIN 500 MG Tabs  Commonly known as: Glucophage     metoprolol succinate ER 25 MG Tb24  Commonly known as: Toprol XL     Multi-Vitamin/Minerals Tabs     simvastatin 40 MG Tabs  Commonly known as: Zocor  Take 1 tablet (40 mg total) by mouth daily.            STOP taking these medications      aspirin 81 MG Tbec     diclofenac 75 MG Tbec  Commonly known as: Voltaren     HYDROcodone-acetaminophen 5-325 MG Tabs  Commonly known as: Norco               Where to Get Your Medications        You can get these medications from any pharmacy    Bring a paper prescription for each of these medications  apixaban 2.5 MG Tabs  oxyCODONE 5 MG Tabs       I reconciled current and discharge medications on the day of discharge.    Imaging/Diagnostic Reports  US VENOUS DOPPLER LEG LEFT - DIAG IMG (CPT=93971)  Result Date: 6/29/2025  PROCEDURE: US VENOUS DOPPLER LEG LEFT - DIAG IMG (CPT=93971) INDICATIONS: L calf pain S/P ORIF L periprosthetic femur fracture PATIENT STATED HISTORY: COMPARISON: No comparisons. TECHNIQUE:  Real time, grey scale, and duplex ultrasound was used to evaluate the lower extremity venous system. B-mode two-dimensional images of the vascular structures, Doppler spectral analysis, and color flow.  Doppler imaging were performed.  The following veins were imaged:  Common, deep, and superficial femoral, popliteal, sapheno-femoral junction, posterior tibial veins, and the contralateral common femoral vein. FINDINGS: SAPHENOFEMORAL JUNCTION: No reflux. THROMBI: None visible. COMPRESSION: Normal compressibility, phasicity, and augmentation. OTHER: Negative.     CONCLUSION: No evidence of DVT in the left lower extremity. Electronically Verified and Signed by Attending Radiologist: Buzz Fields MD 6/29/2025 3:55 PM Workstation: [id number]    XR FLUOROSCOPY C-ARM TIME LESS THAN 1 HOUR (CPT=76000)  Result Date: 6/26/2025  PROCEDURE: XR FLUOROSCOPY C-ARM TIME LESS THAN  1 HOUR (CPT=76000) INDICATIONS: OPEN REDUCTION INTERNAL FIXATION LEFT DISTAL PERIPROSTETIC FEMUR FRACTURE COMPARISON: There are no comparisons for this exam. TECHNIQUE: Intraoperative fluoroscopic films were obtained. Total dose is 6.6038 mGy FINDINGS: Fluoroscopy provided for guidance. No radiologist was present or consulted for the procedure. There are expected intraoperative changes present. Please refer to the operative report for further details. OTHER:Negative.     CONCLUSION: See above. Electronically Verified and Signed by Attending Radiologist: Darius Perry MD 6/26/2025 8:41 PM Workstation: [id number]    XR CHEST AP PORTABLE  (CPT=71045)  Result Date: 6/25/2025  PROCEDURE: XR CHEST AP PORTABLE  (CPT=71045) INDICATIONS: ccollar on patient. lives home alone, poss failure to thrive. fell at home unwittnessed down for 15 mins. deniesm htinners. left knee pain. denies loc. mechanical fall. htn at home. not a good historian. 87-88% on room air, 2l nc.not on o2 at home. ao x4. son lazara is aware. 18 g COMPARISON: 10/30/2018 chest and 10/22/2018 chest radiograph. FINDINGS: Sternotomy wires and surgical clips are noted consistent with previous CABG. Mildly prominent vascular and interstitial markings are present. No debby alveolar edema. No consolidation. Heart size is normal. Mild tortuosity of the thoracic aorta with aortic calcification. Mild degenerative changes are seen in the spine. Marked arthritic changes are seen in both shoulders and chronic rotator cuff injuries are seen in both shoulders also which is unchanged.     CONCLUSION: Status post CABG. Mild interstitial and vascular prominence could reflect mild interstitial pulmonary edema or pulmonary scarring. No consolidation or pleural effusion. Electronically Verified and Signed by Attending Radiologist: Isiah Barraza MD 6/25/2025 6:35 PM Workstation: [id number]    XR KNEE (1 OR 2 VIEWS), LEFT (CPT=73560)  Result Date: 6/25/2025  PROCEDURE: XR KNEE (1 OR  2 VIEWS), LEFT (CPT=73560) INDICATIONS: Fall, landed on knee, knee pain COMPARISON: Comparison made to a previous portable left knee radiographs from 9/5/2014. FINDINGS: Bones: Obliquely oriented diaphyseal fracture extending to the metaphysis noted. There is anterior displacement of the distal fracture fragment by 12 mm. The fracture deformity extends to the left femoral arthroplasty prosthesis. There is no subluxation or dislocation. There is mild comminution of the fracture. The patient is status post total left knee arthroplasty. Prosthetic components appear normally located. There is a lipohemarthrosis seen within the knee joint. Soft Tissues: Lipohemarthrosis seen in the knee joint. Extensive vascular calcifications are seen.     CONCLUSION: Acute fracture involving the visualized portion of the mid and distal femoral diaphysis extending to the metaphysis and femoral component of the left knee arthroplasty. There is anterior displacement of the distal fracture fragment and there is mild comminution. There is a lipohemarthrosis seen. Electronically Verified and Signed by Attending Radiologist: Isiah Barraza MD 6/25/2025 3:40 PM Workstation: [id number]    CT BRAIN OR HEAD (CPT=70450)  Result Date: 6/25/2025  PROCEDURE: CT BRAIN OR HEAD (CPT=70450) INDICATIONS: Fall, struck head COMPARISON: There are no comparisons for this exam. TECHNIQUE: Noncontrast CT scanning is performed through the brain. Automated exposure control techniques for dose reduction were used. Dose information is transmitted to the ACR (American College of Radiology) NRDR (National Radiology Data Registry) which includes the Dose Index Registry. FINDINGS: FINDINGS: There is cerebral atrophy with symmetric prominence of the ventricles. There are patchy areas of low attenuation in the periventricular and deep white matter which are nonspecific but most consistent with small vessel ischemic changes. There is no evidence of hemorrhage, mass,  midline shift, or extra-axial fluid collection. The visualized paranasal sinuses show no significant sinus disease. No evidence of depressed skull fracture.     CONCLUSION: 1. No acute intracranial findings 2. Cerebral atrophy with chronic microvascular ischemic changes. Electronically Verified and Signed by Attending Radiologist: Kal Fuller MD 6/25/2025 2:50 PM Workstation: [id number]      Secondary Discharge Diagnoses  As above    Pertinent Physical Exam At Time of Discharge  Vitals:    06/30/25 2000 06/30/25 2330 07/01/25 0400 07/01/25 0805   BP: 157/56 137/58 122/53 131/53   BP Location: Left arm Left arm Left arm Left arm   Pulse: 69 69 65 60   Resp: 18 16 14 16   Temp: 98 °F (36.7 °C) 98.3 °F (36.8 °C) 97.5 °F (36.4 °C) 97.7 °F (36.5 °C)   TempSrc: Oral Oral Oral Oral   SpO2: 93% 97% 93% 93%   Weight:       Height:            General: no acute distress  Heart: RRR  Lungs: CTAB  Abd: Soft, NT, ND  Neuro: no focal deficits    Total time coordinating care 36 mins.    Patient had opportunity to ask questions and stated understanding and agreed with therapeutic plan as outlined.     Fausto Malloy DO  7/1/2025

## 2025-07-01 NOTE — PLAN OF CARE
A&Ox4. VSS. On room air. /IS. Telemetry monitoring. SCD to RLE. Ankle pumps encouraged. Tolerating diet. Last BM 6/30. Banda catheter in place d/t urinary retention. Pain managed with PO medication. Aquacel x2 to LLE C/D/I, immobilizer in place. Plan is for dc to [facility] today. Patient updated and in agreement with plan of care. Safety precautions in place. Instructed patient to call for assistance, call light within reach.

## 2025-07-01 NOTE — PLAN OF CARE
Patient A&O X4 on 1L O2. VSS, /IS/telemetry- NSR. SCD to RLE, Eliquis. Banda in place and draining, LBM 06/30. Surgical incision to left hip covered with aquacel x2, c/d/i. Pain controlled with PO medication. NWB. PT/OT. Reminded to use call light. Plan to dc to [facility] tomorrow.

## 2025-07-01 NOTE — DISCHARGE INSTRUCTIONS
Hip Fracture Discharge Instructions    Activity    Weight bearing restrictions:    Non-weight bearing (NWB) ? you may NOT put any weight at all on your leg. You will need to use a walker, crutches, or          wheelchair to get around.  Toe-touch weight bearing (TTWB) or touch-down weight bearing(TDWB) ? you may only put your toes down lightly. This is for balance only. This often means you may not put more than 10 percent of your body weight on your leg. You will need to use a walker, crutches, or wheelchair to get around.  Partial weight bearing (PWB) ? you may only put about half of your weight on your leg. You will need a walker or crutches to get around.  Weight bearing as tolerated (WBAT) ? you may put as much of your weight on your leg as you can tolerate. This means you may use a walker, crutches, cane- or no device at all to get around.  Full weight bearing (FWB) ? you may put full weight on your leg. You may not need any device at all to help with walking.     Bathing  No tub baths, pools, or saunas until cleared by surgeon (about 4-6 weeks because it takes that long for the incision(s) on the skin to heal and be a barrier to prevent infection.)    When allowed to shower:  AQUACEL dressing is waterproof and does not require being covered before showering.  Pat dressing(s) and surrounding skin dry after shower.   There may be one incision or a few that have a dressing.                                      AQUACEL           MEDIPORE/COVERLET           DRY STERILE GAUZE                                                                                                                         MEDIPORE/COVERLET dressing and dry sterile gauze are NOT waterproof and REQUIRE being covered with a waterproof barrier to keep the dressing and incision dry.  SARAN WRAP, GLAD WRAP, PRESS N SEAL WORK REALLY WELL BUT ANY PLASTIC WRAP WILL DO.  Do not wash incision.   Remove entire wrapping and old dressing (if  Medipore/coverlet or gauze) after showering. Pat dry with a CLEAN TOWEL if necessary and cover incision with new Medipore/coverlet or gauze.    Incision care/Dressing changes  Wash hands before and after dressing changes.  There may be one or a few dressings on the hip/leg    FOR MEDIPORE/COVERLET DRESSINGS:  Change dressing daily using Medipore/coverlet once Aquacel (waterproof) dressing is removed (which is about 7 days after surgery). Patient should be standing or lying flat so dressing goes on smoothly.  (This dressing needed for hip patients because of location of incision-don’t want contamination from bathroom use)  FOR DRY STERILE GAUZE DRESSINGS:   Change dressing daily using dry sterile gauze and paper tape.  Watch ALL incision for any redness, drainage, increased warmth or opening of the incision.   Call surgeon if you notice any of these.  No lotions or ointments on or near incision(s).                             DRY STERILE GAUZE                         MEDIPORE /COVERLET    Continue dressing changes until your first visit with your surgeon’s office.  There could be a small amount of redness around the staples or incision; this is normal.  Watch for increased redness, warmth, any odor, increased drainage or opening of the incision. A little clear yellow or blood tinged drainage is normal up to 2 weeks after surgery but it should be less every day until it stops.  Call physician if you notice any concerning changes.  Sutures/staples will be removed at first office visit (10 days- 3 weeks).       Driving  Do not drive until cleared by surgeon. Possibly six weeks after surgery. Discuss this at follow-up office visit.   Not allowed while taking narcotic pain medication or muscle relaxants.    Sex  Usually allowed after six weeks - check with surgeon at your office visit.    Return to work  Usually allowed after six weeks. Discuss specific work activities with your surgeon.    Restrictions  Follow  instructions provided by physical therapy    No smoking  Avoid smoking. It is known to cause breathing problems and can decrease the rate of healing.                      Medication  Anticoagulants = blood thinners (Xarelto, Eliquis, Lovenox, Coumadin or Aspirin)  Pill or shot form depending on what your physician orders.   IF placed on Coumadin, you may also need lab work done for monitoring.  You will bleed easier and bruise easier while on these medications.   Usually you will be on a blood thinner for about 4-5 weeks after surgery.  Contact your physician if you have signs of bruising, nose bleeds or blood in your urine. Use electric razors and soft toothbrushes only.  Do not take NSAIDS (non-steroidal anti-inflammatory medications) while taking blood thinners unless ordered by your surgeon.  Review anticoagulant education information sheet provided.    Discomfort  Surgical discomfort is normal for one to two months.  Have realistic goals and keep a positive outlook.  Keep pain manageable; pain should not disrupt your sleep or activities like getting out of bed or walking.  You may need pain medication regularly (every 4-6 hours) the first 2 weeks and then begin to decrease how often you are taking it.  Take pain medication as prescribed with food, especially before therapy, allowing 30-60 minutes to take effect.  Do not drink alcohol while on pain medication.  As you have less discomfort, decrease the amount of pain medication you take. Use plain Tylenol (acetaminophen) for less severe pain.  Some pain medications have Tylenol (acetaminophen) in them such as Norco and Percocet. Do NOT take Tylenol (acetaminophen) within 4 hours of a dose of these medications.  Apply ice or cold therapy to surgical site for 20 minutes at least four times a day, especially after therapy. Be sure there is a thin cloth barrier between skin and ice or cold therapy.  Change position at least every 45 minutes while awake to avoid  stiffness or increased discomfort.  Deep breathing and relaxation techniques and distractions can help!  If you focus on something else, you do not experience the pain the same. Take advantage of everything available to your to help control you discomfort.  Contact physician if discomfort does not respond to pain medication.    Body changes  Constipation is common with the use of narcotics.  Eat fiber rich foods and drink plenty of fluids, at least 4-6 glasses of water a day, unless restricted.  To prevent constipation, use stool softeners such as Colace and laxatives such as Miralax, Senakot or Milk of Magnesia while taking laxatives.   An enema or suppository may be needed if above measures do not work.    Prevention of infection and promotion of healing  Good hand washing is important. Everyone should wash their hands or use hand  as soon as they walk in your house-whether they live there or are visiting.  Keep bed linen/clothing freshly laundered.  Do not allow others or pets to touch your incision.  Avoid people that have colds or the flu.  Your surgeon may recommend that you take antibiotics before you undergo any dental or other invasive surgical procedures after your hip fracture surgery. Speak with your surgeon about this at your post-op office visit.  Continue using incentive spirometry because narcotics make you sleepy so you may not take good deep breaths. We do not want you to get pneumonia.     Post op Office visits  Schedule 10 days to 3 weeks after surgery WITH SURGEON’s office.  Additional visits may need to be scheduled. Your physician will discuss this at first post-op office visit.  Schedule outpatient physical therapy per your surgeon’s orders.  Schedule one week follow up after surgery WITH PRIMARY CARE PHYSICIAN; review your medications over last 6 months.  Your body gets stressed by surgery and that stress can affect all your other health issues (such as high blood pressure,  diabetes, CHF, afib, and asthma just to name a few).  It is much better to catch developing problems and prevent them from becoming larger ones.  SEKOU HOSE - IF ordered by your surgeon, wear these during the day and off at night.  Surgeon will tell you when you don’t need them anymore.    Notify your surgeon if you notice any of the following signs  Separation of incision line.  Increased redness, swelling, or warmth of skin around incision.  Increased or foul smelling drainage from incision  Red streaks on skin near incision.  Temperature >100.4F.  Increased pain at incision not relieved by pain medication.    Signs of Possible Hip Dislocation IF Total Hip Replacement or Javon-arthroplasty Done  Increased severe leg or groin pain  Turning in or out of surgical leg that is new  Increased numbness, tingling to leg  Inability to walk or put weight on your surgical leg  Signs of blood clot  Pain, excessive tenderness, redness, or swelling in leg or calf (other than incision site).  Call physician and await their directions.    Go directly to the ER or CALL 911 if you:  become short of breath  have chest pain  cough up blood  have unexplained anxiety with breathing     Traveling and Handicapped parking  Check with you surgeon when you are allowed to travel so you don’t set yourself up for greater chance of complications.  If traveling by car, get out to stretch every 2 hours.  This helps prevent stiffness.  You may need to do this any time you travel for the first year after surgery.  If traveling by plane, BEFORE you get into a security line, let them know that you had your hip replaced, as you will most likely set off the metal detector. The doctors no longer provide an identification card for this as they are easily copied. ALSO request a wheelchair the first year to board and get off a plane…this aids in priority seating and you should sit on the aisle or at the bulkhead where you can easily stretch your legs and get  up to walk up and down the aisles…this helps prevent blood clots and stiffness.  TEMPORARY HANDICAP PARKING APPLICATION  (good for 3-6 months)  - At Surgeon or PCP visit, request they fill out their part of the form. You fill our your portion, then go to Department of Motor Vehicles. Some [facility] offices provide the same service. ([facility] and [place] have this service; if you live in another [facility], you may check with them as well). You need space to open car doors to position yourself properly with walker to get in and out of your car safely; some parking spaces are  practically on top of each other and do not give you enough room.     SPECIAL  INSTRUCTIONS:

## 2025-07-01 NOTE — PROGRESS NOTES
Patient cleared for discharge. IV removed. Scripts, dc paperwork, and belongings sent with transport. Patient discharging to [facility] via ambulance.    The patient is a 65y Female complaining of wound check.

## 2025-07-01 NOTE — CM/SW NOTE
Spoke with pt's RN who stated pt can discharge to [facility] today. Spoke with Francy from [place] who confirmed pt can be accepted for admission this morning. Ambulance transport scheduled for 1100. PCS form completed and available for RN to print. Spoke with pt's son, Yehuda who is agreeable with DC plan. He will meet her at the facility. Updated RN. No other needs at this time. / to remain available for support and/or discharge planning.     [facility]  [phone]    [facility]  [phone]    [facility]ica Bullock [facility]  Discharge Planner  [phone]

## (undated) DEVICE — SPONGE LAP 18X18 XRAY STRL

## (undated) DEVICE — COVER,LIGHT,CAMERA,HARD,1/PK,STRL: Brand: MEDLINE

## (undated) DEVICE — SUTURE VICRYL 2-0 CT-1

## (undated) DEVICE — SUTURE PROLENE 6-0 C-1

## (undated) DEVICE — SOL LACT RINGERS 1000ML

## (undated) DEVICE — BNDG,ELSTC,MATRIX,STRL,6"X5YD,LF,HOOK&LP: Brand: MEDLINE

## (undated) DEVICE — C-ARM: Brand: UNBRANDED

## (undated) DEVICE — TRANSPOSAL ULTRAFLEX DUO/QUAD ULTRA CART MANIFOLD

## (undated) DEVICE — ZZ-DISC-SUB-448781-SUT COAT VCRL 2-0 27IN CP-1 ABSRB UD 36MM 1/2

## (undated) DEVICE — DRESSING,GAUZE,OIL EMULSION,CURAD,3"X16": Brand: CURAD

## (undated) DEVICE — STANDARD HYPODERMIC NEEDLE,POLYPROPYLENE HUB: Brand: MONOJECT

## (undated) DEVICE — GLOVE SUR 7 SENSICARE PI PIP GRN PWD F

## (undated) DEVICE — GAUZE,SPONGE,FLUFF,6"X6.75",STRL,5/TRAY: Brand: MEDLINE

## (undated) DEVICE — SOLUTION IRRIG 1000ML 0.9% NACL USP BTL

## (undated) DEVICE — DISPOSABLE TOURNIQUET CUFF SINGLE BLADDER, DUAL PORT AND QUICK CONNECT CONNECTOR: Brand: COLOR CUFF

## (undated) DEVICE — SUT VCRL + 1 27IN ABSRB UD OS-6 L36MM

## (undated) DEVICE — SUTURE PROLENE 8-0 BV-130-5

## (undated) DEVICE — GAUZE SPONGES,12 PLY: Brand: CURITY

## (undated) DEVICE — GOWN SUR 2XL LEV 4 BLU W/ WHT V NK BND AERO

## (undated) DEVICE — SUTURE PROLENE 7-0 CC

## (undated) DEVICE — SUTURE SILK 2-0

## (undated) DEVICE — BLADE SW 32X6MM  STRNM

## (undated) DEVICE — TIBURON DRAPE TOWELS: Brand: CONVERTORS

## (undated) DEVICE — TRAY ENDOVEIN KTV16 HARVESTING

## (undated) DEVICE — SUT COAT VCRL 0 27IN CP-1 ABSRB UD 36MM 1/2

## (undated) DEVICE — SUTURE POLYDEK 3-0 69-738

## (undated) DEVICE — FIXED CORE WIRE GUIDE SAFE-T-J, CURVED: Brand: COOK

## (undated) DEVICE — SUTURE VICRYL 3-0 PS-1

## (undated) DEVICE — SUTURE POLYDEK 2-0

## (undated) DEVICE — Device: Brand: DRILL BIT

## (undated) DEVICE — PROXIMATE RH ROTATING HEAD SKIN STAPLERS (35 WIDE) CONTAINS 35 STAINLESS STEEL STAPLES: Brand: PROXIMATE

## (undated) DEVICE — PAD,ABDOMINAL,8"X7.5",ST,LF,20/BX: Brand: MEDLINE

## (undated) DEVICE — PADDING CAST 6INX4YD 100% COT COHESIVE LP

## (undated) DEVICE — GLOVE SUR 7.5 SENSICARE PI PIP CRM PWD F

## (undated) DEVICE — Device

## (undated) DEVICE — ANTIBACTERIAL UNDYED BRAIDED (POLYGLACTIN 910), SYNTHETIC ABSORBABLE SUTURE: Brand: COATED VICRYL

## (undated) DEVICE — GLOVE SURG TRIUMPH SZ 8

## (undated) DEVICE — DRAPE SLUSH/WARMER W/DISC

## (undated) DEVICE — STERILE POLYISOPRENE POWDER-FREE SURGICAL GLOVES: Brand: PROTEXIS

## (undated) DEVICE — GLOVE SUR 7 SENSICARE PI PIP CRM PWD F

## (undated) DEVICE — Device: Brand: NCB®

## (undated) DEVICE — HEMOCLIP HORIZON LG 004200

## (undated) DEVICE — CELL SAVER BAG 600ML 4R2023

## (undated) DEVICE — CELL SAVER RESERVOIR BRAT

## (undated) DEVICE — GOWN,SIRUS,FABRNF,L,20/CS: Brand: MEDLINE

## (undated) DEVICE — SLEEVE COMPR MD KNEE LEN SGL USE KENDALL SCD

## (undated) DEVICE — OPEN HEART: Brand: MEDLINE INDUSTRIES, INC.

## (undated) DEVICE — KIRSCHNER WIRE, WITH TROCAR TIP, Ø 2.0 M: Brand: KIRSCHNER WIRE

## (undated) DEVICE — NEPTUNE E-SEP SMOKE EVACUATION PENCIL, COATED, 70MM BLADE, PUSH BUTTON SWITCH: Brand: NEPTUNE E-SEP

## (undated) DEVICE — PDS II VLT 0 107CM AG ST3: Brand: ENDOLOOP

## (undated) DEVICE — CELL SAVER 5/5+ BOWL KIT-225ML: Brand: HAEMONETICS CELL SAVER 5/5+ SYSTEMS

## (undated) NOTE — LETTER
[facility] [address], [address]  Consent for Procedure/Sedation    Date:        Time:       1.  I authorize the performance upon Miriam Contreras the following:cardiac catheterization, left ventricular cineangiography, bilateral period, the physician will determine when the applicable recovery period ends for purposes of reinstating the Do Not Resuscitate (DNR) order.     Signature of Patient: ____________________________________________________    Signature of person authorized

## (undated) NOTE — LETTER
[address]  [address]  Authorization for Surgical Operation and Procedure     Date:___________                                                                                                         Time:__________  I hereby authorize Surgeon(s):  Bunny Del Castillo MD, my physician and his/her assistants (if applicable), which may include medical students, residents, and/or fellows, to perform the following surgical operation/ procedure and administer such anesthesia as may be determined necessary by my physician:  Operation/Procedure name (s) Procedure(s):  OPEN REDUCTION INTERNAL FIXATION LEFT DISTAL PERIPROSTETIC FEMUR FRACTURE on Miriam Contreras   2.   I recognize that during the surgical operation/procedure, unforeseen conditions may necessitate additional or different procedures than those listed above.  I, therefore, further authorize and request that the above-named surgeon, assistants, or designees perform such procedures as are, in their judgment, necessary and desirable.    3.   My surgeon/physician has discussed prior to my surgery the potential benefits, risks and side effects of this procedure; the likelihood of achieving goals; and potential problems that might occur during recuperation.  They also discussed reasonable alternatives to the procedure, including risks, benefits, and side effects related to the alternatives and risks related to not receiving this procedure.  I have had all my questions answered and I acknowledge that no guarantee has been made as to the result that may be obtained.    4.   Should the need arise during my operation/procedure, which includes change of level of care prior to discharge, I also consent to the administration of blood and/or blood products.  Further, I understand that despite careful testing and screening of blood or blood products by collecting agencies, I may still be subject to ill effects as a result of receiving a blood  transfusion and/or blood products.  The following are some, but not all, of the potential risks that can occur: fever and allergic reactions, hemolytic reactions, transmission of diseases such as Hepatitis, AIDS and Cytomegalovirus (CMV) and fluid overload.  In the event that I wish to have an autologous transfusion of my own blood, or a directed donor transfusion, I will discuss this with my physician.  Check only if Refusing Blood or Blood Products  I understand refusal of blood or blood products as deemed necessary by my physician may have serious consequences to my condition to include possible death. I hereby assume responsibility for my refusal and release the hospital, its personnel, and my physicians from any responsibility for the consequences of my refusal.          o  Refuse      5.   I authorize the use of any specimen, organs, tissues, body parts or foreign objects that may be removed from my body during the operation/procedure for diagnosis, research or teaching purposes and their subsequent disposal by hospital authorities.  I also authorize the release of specimen test results and/or written reports to my treating physician on the hospital medical staff or other referring or consulting physicians involved in my care, at the discretion of the Pathologist or my treating physician.    6.   I consent to the photographing or videotaping of the operations or procedures to be performed, including appropriate portions of my body for medical, scientific, or educational purposes, provided my identity is not revealed by the pictures or by descriptive texts accompanying them.  If the procedure has been photographed/videotaped, the surgeon will obtain the original picture, image, videotape or CD.  The hospital will not be responsible for storage, release or maintenance of the picture, image, tape or CD.    7.   I consent to the presence of a  or observers in the operating room as deemed  necessary by my physician or their designees.    8.   I recognize that in the event my procedure results in extended X-Ray/fluoroscopy time, I may develop a skin reaction.    9. If I have a Do Not Attempt Resuscitation (DNAR) order in place, that status will be suspended while in the operating room, procedural suite, and during the recovery period unless otherwise explicitly stated by me (or a person authorized to consent on my behalf). The surgeon or my attending physician will determine when the applicable recovery period ends for purposes of reinstating the DNAR order.  10. Patients having a sterilization procedure: I understand that if the procedure is successful the results will be permanent and it will therefore be impossible for me to inseminate, conceive, or bear children.  I also understand that the procedure is intended to result in sterility, although the result has not been guaranteed.   11. I acknowledge that my physician has explained sedation/analgesia administration to me including the risk and benefits I consent to the administration of sedation/analgesia as may be necessary or desirable in the judgment of my physician.    I CERTIFY THAT I HAVE READ AND FULLY UNDERSTAND THE ABOVE CONSENT TO OPERATION and/or OTHER PROCEDURE.    _________________________________________  __________________________________  Signature of Patient     Signature of Responsible Person         ___________________________________         Printed Name of Responsible Person           _________________________________                 Relationship to Patient  _________________________________________  ______________________________  Signature of Witness          Date  Time      Patient Name: Miriam LAL Ben     : 1945                 Printed: 2025     Medical Record #: [id number]                     Page [address] of [address] [place] [address]   [id number]    Consent for Anesthesia    IMiriam agree to be cared for by an anesthesiologist, who is specially trained to monitor me and give me medicine to put me to sleep or keep me comfortable during my procedure    I understand that my anesthesiologist is not an employee or agent of [facility] or [employer]. He or she works for [employer].    As the patient asking for anesthesia services, I agree to:  Allow the anesthesiologist (anesthesia doctor) to give me medicine and do additional procedures as necessary. Some examples are: Starting or using an “IV” to give me medicine, fluids or blood during my procedure, and having a breathing tube placed to help me breathe when I’m asleep (intubation). In the event that my heart stops working properly, I understand that my anesthesiologist will make every effort to sustain my life, unless otherwise directed by [facility] Do Not Resuscitate documents.  Tell my anesthesia doctor before my procedure:  If I am pregnant.  The last time that I ate or drank.  All of the medicines I take (including prescriptions, herbal supplements, and pills I can buy without a prescription (including street drugs/illegal medications). Failure to inform my anesthesiologist about these medicines may increase my risk of anesthetic complications.  If I am allergic to anything or have had a reaction to anesthesia before.  I understand how the anesthesia medicine will help me (benefits).  I understand that with any type of anesthesia medicine there are risks:  The most common risks are: nausea, vomiting, sore throat, muscle soreness, damage to my eyes, mouth, or teeth (from breathing tube placement).  Rare risks include: remembering what happened during my procedure, allergic reactions to medications, injury to my airway, heart, lungs, vision, nerves, or muscles and in extremely rare instances death.  My doctor has explained to me other choices  available to me for my care (alternatives).  Pregnant Patients (“epidural”):  I understand that the risks of having an epidural (medicine given into my back to help control pain during labor), include itching, low blood pressure, difficulty urinating, headache or slowing of the baby’s heart. Very rare risks include infection, bleeding, seizure, irregular heart rhythms and nerve injury.  Regional Anesthesia (“spinal”, “epidural”, & “nerve blocks”):  I understand that rare but potential complications include headache, bleeding, infection, seizure, irregular heart rhythms, and nerve injury.    I can change my mind about having anesthesia services at any time before I get the medicine.    _____________________________________________________________________________  Patient (or Representative) Signature/Relationship to Patient  Date   Time    _____________________________________________________________________________   Name (if used)    Language/Organization   Time    _____________________________________________________________________________  Anesthesiologist Signature     Date   Time  I have discussed the procedure and information above with the patient (or patient’s representative) and answered their questions. The patient or their representative has agreed to have anesthesia services.    _____________________________________________________________________________  Witness        Date   Time  I have verified that the signature is that of the patient or patient’s representative, and that it was signed before the procedure  Patient Name: Miriam Contreras     : 1945                 Printed: 2025     Medical Record #: [id number]                     Page 2 of 2

## (undated) NOTE — LETTER
Janet Sanchez M.D., F.A.C.S. Sage Gould M.D., F.A.C.S. Irving Chinchilla M.D., Kristi Harrington. JENNIFER Lance M.D., F.A.C.S. Sharmaine Gitelman. Felipe Poon M.D., F.A.C.S. MILADYS Chong M. Sherrlyn Maple A.D. Goble Sanfilippo, M.D., F. chance to have all of your questions and concerns answered. If there are any issues which have not been adequately addressed, we ask you to bring them forward so that we can thoroughly address them.     A patient who is fully informed and understands their treatment, among other options and the risks and benefits of the different treatment options:    Yes _____ No _____    A [facility] surgeon as explained to me that if I should so desire, he/she is willing to explain my case and the surgical and non-surgical optio

## (undated) NOTE — LETTER
Delmi Beauchamp [address]  [place], [address] [place] [address]    Consent for Operation  Date: __________________                                Time: _______________    1.  I authorize the performance upon Lon Teixeira the following operation:    2. cor procedure has been videotaped, the surgeon will obtain the original videotape. The hospital will not be responsible for storage or maintenance of this tape.   7. For the purpose of advancing medical education, I consent to the admittance of observers to the STATEMENTS REQUIRING INSERTION OR COMPLETION WERE FILLED IN.     Signature of Patient:   ___________________________    When the patient is a minor or mentally incompetent to give consent:  Signature of person authorized to consent for patient: ____________ supplements, and pills I can buy without a prescription (including street drugs/illegal medications). Failure to inform my anesthesiologist about these medicines may increase my risk of anesthetic complications. iv.  If I am allergic to anything or have ha Anesthesiologist Signature     Date   Time  I have discussed the procedure and information above with the patient (or patient’s representative) and answered their questions. The patient or their representative has agreed to have anesthesia services.     ___

## (undated) NOTE — LETTER
[address] [facility] FOR BLOOD OR BLOOD COMPONENTS      In the course of your treatment, it may become necessary to administer a transfusion of blood or blood components.  This form provides basic information concerning this proc explain the alternatives to you if it has not already been done. I,Mriiam Contreras, have read/had read to me the above. I understand the matters bearing on the decision whether or not to authorize a transfusion of blood or blood components.  I have no que